# Patient Record
Sex: FEMALE | Race: AMERICAN INDIAN OR ALASKA NATIVE | Employment: UNEMPLOYED | URBAN - METROPOLITAN AREA
[De-identification: names, ages, dates, MRNs, and addresses within clinical notes are randomized per-mention and may not be internally consistent; named-entity substitution may affect disease eponyms.]

---

## 2019-10-07 ENCOUNTER — HOSPITAL ENCOUNTER (INPATIENT)
Age: 19
LOS: 3 days | Discharge: HOME | DRG: 754 | End: 2019-10-10
Attending: PSYCHIATRY & NEUROLOGY | Admitting: PSYCHIATRY & NEUROLOGY
Payer: COMMERCIAL

## 2019-10-07 ENCOUNTER — HOSPITAL ENCOUNTER (EMERGENCY)
Age: 19
Discharge: OTHER INSTITUTION | End: 2019-10-07
Attending: EMERGENCY MEDICINE
Payer: COMMERCIAL

## 2019-10-07 VITALS
OXYGEN SATURATION: 99 % | SYSTOLIC BLOOD PRESSURE: 130 MMHG | RESPIRATION RATE: 15 BRPM | DIASTOLIC BLOOD PRESSURE: 77 MMHG | HEART RATE: 77 BPM | TEMPERATURE: 98.9 F

## 2019-10-07 DIAGNOSIS — R45.88 NON-SUICIDAL SELF HARM: Primary | ICD-10-CM

## 2019-10-07 LAB
ALBUMIN SERPL-MCNC: 4.2 G/DL (ref 3.5–5)
ALBUMIN/GLOB SERPL: 1.1 {RATIO} (ref 1.1–2.2)
ALP SERPL-CCNC: 66 U/L (ref 45–117)
ALT SERPL-CCNC: 22 U/L (ref 12–78)
AMPHET UR QL SCN: NEGATIVE
ANION GAP SERPL CALC-SCNC: 8 MMOL/L (ref 5–15)
APAP SERPL-MCNC: <2 UG/ML (ref 10–30)
APPEARANCE UR: CLEAR
AST SERPL-CCNC: 7 U/L (ref 15–37)
BACTERIA URNS QL MICRO: NEGATIVE /HPF
BARBITURATES UR QL SCN: NEGATIVE
BASOPHILS # BLD: 0 K/UL (ref 0–0.1)
BASOPHILS NFR BLD: 0 % (ref 0–1)
BENZODIAZ UR QL: NEGATIVE
BILIRUB SERPL-MCNC: 0.2 MG/DL (ref 0.2–1)
BILIRUB UR QL: NEGATIVE
BUN SERPL-MCNC: 10 MG/DL (ref 6–20)
BUN/CREAT SERPL: 14 (ref 12–20)
CALCIUM SERPL-MCNC: 9.6 MG/DL (ref 8.5–10.1)
CANNABINOIDS UR QL SCN: POSITIVE
CHLORIDE SERPL-SCNC: 106 MMOL/L (ref 97–108)
CO2 SERPL-SCNC: 24 MMOL/L (ref 21–32)
COCAINE UR QL SCN: NEGATIVE
COLOR UR: ABNORMAL
COMMENT, HOLDF: NORMAL
CREAT SERPL-MCNC: 0.7 MG/DL (ref 0.55–1.02)
DIFFERENTIAL METHOD BLD: ABNORMAL
DRUG SCRN COMMENT,DRGCM: ABNORMAL
EOSINOPHIL # BLD: 0 K/UL (ref 0–0.4)
EOSINOPHIL NFR BLD: 0 % (ref 0–7)
EPITH CASTS URNS QL MICRO: ABNORMAL /LPF
ERYTHROCYTE [DISTWIDTH] IN BLOOD BY AUTOMATED COUNT: 14.3 % (ref 11.5–14.5)
ETHANOL SERPL-MCNC: <10 MG/DL
GLOBULIN SER CALC-MCNC: 3.8 G/DL (ref 2–4)
GLUCOSE SERPL-MCNC: 117 MG/DL (ref 65–100)
GLUCOSE UR STRIP.AUTO-MCNC: NEGATIVE MG/DL
HCG UR QL: NEGATIVE
HCT VFR BLD AUTO: 39.5 % (ref 35–47)
HGB BLD-MCNC: 12.5 G/DL (ref 11.5–16)
HGB UR QL STRIP: ABNORMAL
HYALINE CASTS URNS QL MICRO: ABNORMAL /LPF (ref 0–5)
IMM GRANULOCYTES # BLD AUTO: 0 K/UL (ref 0–0.04)
IMM GRANULOCYTES NFR BLD AUTO: 0 % (ref 0–0.5)
KETONES UR QL STRIP.AUTO: 15 MG/DL
LEUKOCYTE ESTERASE UR QL STRIP.AUTO: NEGATIVE
LYMPHOCYTES # BLD: 1.7 K/UL (ref 0.8–3.5)
LYMPHOCYTES NFR BLD: 17 % (ref 12–49)
MCH RBC QN AUTO: 28.2 PG (ref 26–34)
MCHC RBC AUTO-ENTMCNC: 31.6 G/DL (ref 30–36.5)
MCV RBC AUTO: 89.2 FL (ref 80–99)
METHADONE UR QL: NEGATIVE
MONOCYTES # BLD: 0.4 K/UL (ref 0–1)
MONOCYTES NFR BLD: 4 % (ref 5–13)
NEUTS SEG # BLD: 7.8 K/UL (ref 1.8–8)
NEUTS SEG NFR BLD: 79 % (ref 32–75)
NITRITE UR QL STRIP.AUTO: NEGATIVE
NRBC # BLD: 0 K/UL (ref 0–0.01)
NRBC BLD-RTO: 0 PER 100 WBC
OPIATES UR QL: NEGATIVE
PCP UR QL: NEGATIVE
PH UR STRIP: 6.5 [PH] (ref 5–8)
PLATELET # BLD AUTO: 302 K/UL (ref 150–400)
PMV BLD AUTO: 10.9 FL (ref 8.9–12.9)
POTASSIUM SERPL-SCNC: 3.6 MMOL/L (ref 3.5–5.1)
PROT SERPL-MCNC: 8 G/DL (ref 6.4–8.2)
PROT UR STRIP-MCNC: NEGATIVE MG/DL
RBC # BLD AUTO: 4.43 M/UL (ref 3.8–5.2)
RBC #/AREA URNS HPF: ABNORMAL /HPF (ref 0–5)
SALICYLATES SERPL-MCNC: <1.7 MG/DL (ref 2.8–20)
SAMPLES BEING HELD,HOLD: NORMAL
SODIUM SERPL-SCNC: 138 MMOL/L (ref 136–145)
SP GR UR REFRACTOMETRY: 1.02 (ref 1–1.03)
UR CULT HOLD, URHOLD: NORMAL
UROBILINOGEN UR QL STRIP.AUTO: 0.2 EU/DL (ref 0.2–1)
WBC # BLD AUTO: 10 K/UL (ref 3.6–11)
WBC URNS QL MICRO: ABNORMAL /HPF (ref 0–4)

## 2019-10-07 PROCEDURE — 81025 URINE PREGNANCY TEST: CPT

## 2019-10-07 PROCEDURE — 99284 EMERGENCY DEPT VISIT MOD MDM: CPT

## 2019-10-07 PROCEDURE — 80053 COMPREHEN METABOLIC PANEL: CPT

## 2019-10-07 PROCEDURE — 36415 COLL VENOUS BLD VENIPUNCTURE: CPT

## 2019-10-07 PROCEDURE — 81001 URINALYSIS AUTO W/SCOPE: CPT

## 2019-10-07 PROCEDURE — 90791 PSYCH DIAGNOSTIC EVALUATION: CPT

## 2019-10-07 PROCEDURE — 80307 DRUG TEST PRSMV CHEM ANLYZR: CPT

## 2019-10-07 PROCEDURE — 85025 COMPLETE CBC W/AUTO DIFF WBC: CPT

## 2019-10-07 PROCEDURE — 65220000003 HC RM SEMIPRIVATE PSYCH

## 2019-10-07 RX ORDER — IBUPROFEN 200 MG
400 TABLET ORAL
COMMUNITY
End: 2019-10-10

## 2019-10-07 NOTE — PROGRESS NOTES
Admission Medication Reconciliation:    Information obtained from:  patient  RxQuery data available¹:  YES    Comments/Recommendations: All medications/allergies have been reviewed and updated; last medication administration times reviewed and recorded. The patient denies taking any scheduled maintenance medications (confirmed via insurance information). Pt reports taking occasional ibuprofen for headache. Changes made to Prior to Admission (PTA) Medication List:   ?   Medications Added:   - ibuprofen  ? Medications Changed:   - None   ? Medications Removed:   - None     ¹RxQuery pharmacy benefit data reflects medications filled and processed through the patient's insurance, however   this data does NOT capture whether the medication was picked up or is currently being taken by the patient. Allergies:  Patient has no known allergies. Significant PMH/Disease States:   History reviewed. No pertinent past medical history. Chief Complaint for this Admission:    Chief Complaint   Patient presents with    Mental Health Problem       Prior to Admission Medications:   Prior to Admission Medications   Prescriptions Last Dose Informant Patient Reported? Taking?   ibuprofen (MOTRIN) 200 mg tablet   Yes Yes   Sig: Take 400 mg by mouth every eight (8) hours as needed for Pain. Facility-Administered Medications: None     Thank you for allowing pharmacy to participate in the coordination of this patient's care. If you have any other questions, please contact the medication reconciliation pharmacist at [phone]. Ludin Monroe D., BCPS

## 2019-10-07 NOTE — ED NOTES
1958 Bedside and Verbal shift change report given to Brittaney Salazar RN (oncoming nurse) by Azra Smith RN (offgoing nurse). Report included the following information SBAR and ED Summary. 2009 attempted to call report to [facility]; RN states she can't take report because there is no bed yet, will try again and [facility] RN will call back with an update    2055 TRANSFER - OUT REPORT:    Verbal report given to AUBREE Mayes(name) on Fabienne Boyd  being transferred to [facility] (unit) for urgent transfer       Report consisted of patients Situation, Background, Assessment and   Recommendations(SBAR). Information from the following report(s) SBAR, ED Summary and Recent Results was reviewed with the receiving nurse. Lines:       Opportunity for questions and clarification was provided.       2100 called [facility] to set up patient transport; ETA within the hour

## 2019-10-07 NOTE — ED NOTES
Bedside and Verbal shift change report given to [address] (oncoming nurse) by Adriana Betancourt (offgoing nurse). Report included the following information SBAR, Kardex, ED Summary, [facility] and Recent Results.

## 2019-10-07 NOTE — BSMART NOTE
Comprehensive Assessment Form Part 1 Section I - Disposition Axis I - Major depressive disorder Axis II - Deferred Axis III - History reviewed. No pertinent past medical history. Axis IV - lack of structure, left her L[employer] day, moved across country, lack of medication Axis V - 45 The Medical Doctor to Psychiatrist conference was not completed. The Medical Doctor is in agreement with Psychiatrist disposition because of (reason) patient is voluntary and seeking admission. The plan is admission to [facility] once cleared. The on-call Psychiatrist consulted was Dr. Jesus Rich. The admitting Psychiatrist will be Dr. Jesus Rich. The admitting Diagnosis is MDD. The Payor source is [facility]. Section II - Integrated Summary Summary:  Patient is a 24yo female who presents to the ER due to suicidal ideation with increased frequency and intensity. She reports moving from [place] recently and set up an appointment with a therapist, Elisabeth Laboy at [facility] [employer]. She was seeing her and it was decided she should see someone for medication. She met with the psychiatrist at [facility] today and they referred her to the ER due to suicidal ideation. Patient reports she was previously on Prozac with positive results but that she stopped seeing her psychiatrist in [place] and stopped the medication. She reports self-harming herself started around age 13 and that she has harmed herself by digging her nails into her legs. With all of the ideation and self-harm it was determined she needed to stay safe and get on medication as soon as possible. She denies homicidal ideation, denies suicidal plan or intent, and denies hallucinations. She was present with her significant other who is supportive of her and encouraging her to seek help.  She reports added stressors are moving from [place] to [place] when her family is in [place], [address] for a job and contemplating finishing college, and leaving her [employer] ([employer]) and feeling lost. She reports 2 prior admission in 2016 for panic attack and other mental health symptoms (vague with details) and in 2017 when she decided to punish herself by taking a bunch of vitamins and supplements to make herself sick then worrying she may have done harm after she was sick for a bit. The patienthas demonstrated mental capacity to provide informed consent. The information is given by the patient and spouse/SO. The Chief Complaint is suicidal ideation. The Precipitant Factors are see axis 4. Previous Hospitalizations: yes The patient has not previously been in restraints. Current Psychiatrist and/or  is [facility]. Lethality Assessment: 
 
The potential for suicide noted by the following: previous history of attempts which occured on (date)2017 in the form(s) of overdosing, vague plan and ideation . The potential for homicide is not noted. The patient has not been a perpetrator of sexual or physical abuse. There are not pending charges. The patient is felt to be at risk for self harm or harm to others. The attending nurse was advised that security has been notified. Section III - Psychosocial 
The patient's overall mood and attitude is calm and cooperative. Feelings of helplessness and hopelessness are observed by verbal report. Generalized anxiety is not observed. Panic is not observed. Phobias are not observed. Obsessive compulsive tendencies are not observed. Section IV - Mental Status Exam 
The patient's appearance shows no evidence of impairment. The patient's behavior shows no evidence of impairment. The patient is oriented to time, place, person and situation. The patient's speech is soft. The patient's mood is depressed and is sad. The range of affect shows no evidence of impairment.   The patient's thought content demonstrates no evidence of impairment. The thought process shows no evidence of impairment. The patient's perception shows no evidence of impairment. The patient's memory shows no evidence of impairment. The patient's appetite shows no evidence of impairment. The patient's sleep shows no evidence of impairment. The patient's insight shows no evidence of impairment. The patient's judgement shows no evidence of impairment. Section V - Substance Abuse The patient is using substances. The patient is using tobacco by inhalation, alcohol for unknown with last use on unknown and cannabis by inhalation for 1-5 years with last use on yesterday. The patient has experienced the following withdrawal symptoms: N/A. Section VI - Living Arrangements The patient has a significant other. This person's approximate age is 21 and appears to be in good health. The patient lives with a significant other. The patient has no children. The patient does plan to return home upon discharge. The patient does not have legal issues pending. The patient's source of income comes from family. Church and cultural practices have not been voiced at this time. The patient's greatest support comes from  and this person will be involved with the treatment. The patient has not been in an event described as horrible or outside the realm of ordinary life experience either currently or in the past. 
The patient has not been a victim of sexual/physical abuse. Section VII - Other Areas of Clinical Concern The highest grade achieved is some college with the overall quality of school experience being described as good. The patient is currently unemployed and speaks [place] as a primary language. The patient has no communication impairments affecting communication. The patient's preference for learning can be described as: can read and write adequately.   The patient's hearing is normal.  The patient's vision is normal. 
 
 
 [facility]

## 2019-10-07 NOTE — ED TRIAGE NOTES
Patient presents from home with complaints of SI \"for the last couple of weeks\". Patient reports she has no concrete plan and denies HI. Patient agrees to contract for safety at this time.   Patient has friend at bedside

## 2019-10-07 NOTE — ED PROVIDER NOTES
The history is provided by the patient and a friend. No  was used. Mental Health Problem    This is a chronic problem. The current episode started more than 1 week ago. The problem has not changed since onset. Associated symptoms include self-injury. Pertinent negatives include no confusion, no somnolence, no seizures, no unresponsiveness, no weakness, no agitation, no delusions, no hallucinations, no violence, no tingling and no numbness. Mental status baseline is normal.  Risk factors include the patient not taking meds correctly. Her past medical history is significant for depression. Her past medical history does not include diabetes, seizures, liver disease, CVA, TIA, AIDS, hypertension, dementia, psychotropic medication treatment, head trauma or heart disease. History reviewed. No pertinent past medical history. History reviewed. No pertinent surgical history. History reviewed. No pertinent family history.     Social History     Socioeconomic History    Marital status: SINGLE     Spouse name: Not on file    Number of children: Not on file    Years of education: Not on file    Highest education level: Not on file   Occupational History    Not on file   Social Needs    Financial resource strain: Not on file    Food insecurity:     Worry: Not on file     Inability: Not on file    Transportation needs:     Medical: Not on file     Non-medical: Not on file   Tobacco Use    Smoking status: Not on file   Substance and Sexual Activity    Alcohol use: Not on file    Drug use: Not on file    Sexual activity: Not on file   Lifestyle    Physical activity:     Days per week: Not on file     Minutes per session: Not on file    Stress: Not on file   Relationships    Social connections:     Talks on phone: Not on file     Gets together: Not on file     Attends Mandaen service: Not on file     Active member of club or organization: Not on file     Attends meetings of clubs or organizations: Not on file     Relationship status: Not on file    Intimate partner violence:     Fear of current or ex partner: Not on file     Emotionally abused: Not on file     Physically abused: Not on file     Forced sexual activity: Not on file   Other Topics Concern    Not on file   Social History Narrative    Not on file         ALLERGIES: Patient has no known allergies. Review of Systems   Constitutional: Negative for activity change, chills and fever. HENT: Negative for nosebleeds, sore throat, trouble swallowing and voice change. Eyes: Negative for visual disturbance. Respiratory: Negative for shortness of breath. Cardiovascular: Negative for chest pain and palpitations. Gastrointestinal: Negative for abdominal pain, constipation, diarrhea and nausea. Genitourinary: Negative for difficulty urinating, dysuria, hematuria and urgency. Musculoskeletal: Negative for back pain, neck pain and neck stiffness. Skin: Negative for color change. Allergic/Immunologic: Negative for immunocompromised state. Neurological: Negative for dizziness, tingling, seizures, syncope, weakness, light-headedness, numbness and headaches. Psychiatric/Behavioral: Positive for self-injury and suicidal ideas. Negative for agitation, behavioral problems, confusion and hallucinations. Vitals:    10/07/19 1537 10/07/19 1619   BP:  153/73   Pulse: (!) 106 (!) 56   Resp:  16   Temp:  98.6 °F (37 °C)   SpO2: 98% 99%            Physical Exam   Constitutional: She appears well-developed and well-nourished. No distress. HENT:   Head: Atraumatic. Eyes: EOM are normal.   Neck: No tracheal deviation present. Cardiovascular:   Warm and well perfused   Pulmonary/Chest: Effort normal. No respiratory distress. Musculoskeletal: Normal range of motion. Neurological: She is alert. Coordination normal.   Skin: Skin is warm and dry. She is not diaphoretic. Psychiatric: She has a normal mood and affect.  Her behavior is normal. Judgment and thought content normal. She expresses no suicidal plans and no homicidal plans. Nursing note and vitals reviewed. MDM     This is a 13-year-old female with past medical history, review of systems, physical exam as above, presenting with self-harm, in the setting of a history of PTSD, depression, anxiety. Patient states several year history of the same, previous hospitalization in the [place] area for the same. She endorses previous suicide by overdose. She states she has been noncompliant with medications over the last 2 years, seeking to reestablish care today, and referred to the emergency department. She denies tobacco use, use of alcohol, or illicit drugs. She denies auditory or visual hallucinations. She denies active suicidality, or homicidality at this time. Physical exam remarkable for well-appearing young female, in no acute distress. Plan to consult with psychiatry for evaluation, disposition pending. Procedures    6:04 PM  [facility] states patient is agreeable with voluntary admission, will pull lab work.

## 2019-10-08 PROBLEM — F41.1 GENERALIZED ANXIETY DISORDER: Status: ACTIVE | Noted: 2019-10-08

## 2019-10-08 PROBLEM — F32.A DEPRESSIVE DISORDER: Status: ACTIVE | Noted: 2019-10-08

## 2019-10-08 PROCEDURE — 74011250637 HC RX REV CODE- 250/637: Performed by: PSYCHIATRY & NEUROLOGY

## 2019-10-08 PROCEDURE — 74011250637 HC RX REV CODE- 250/637: Performed by: NURSE PRACTITIONER

## 2019-10-08 PROCEDURE — 65220000003 HC RM SEMIPRIVATE PSYCH

## 2019-10-08 RX ORDER — CITALOPRAM 20 MG/1
10 TABLET, FILM COATED ORAL DAILY
Status: DISCONTINUED | OUTPATIENT
Start: 2019-10-08 | End: 2019-10-10 | Stop reason: HOSPADM

## 2019-10-08 RX ORDER — HALOPERIDOL 5 MG/ML
5 INJECTION INTRAMUSCULAR
Status: DISCONTINUED | OUTPATIENT
Start: 2019-10-07 | End: 2019-10-10 | Stop reason: HOSPADM

## 2019-10-08 RX ORDER — ACETAMINOPHEN 325 MG/1
650 TABLET ORAL
Status: DISCONTINUED | OUTPATIENT
Start: 2019-10-07 | End: 2019-10-10 | Stop reason: HOSPADM

## 2019-10-08 RX ORDER — ADHESIVE BANDAGE
30 BANDAGE TOPICAL DAILY PRN
Status: DISCONTINUED | OUTPATIENT
Start: 2019-10-07 | End: 2019-10-10 | Stop reason: HOSPADM

## 2019-10-08 RX ORDER — DIPHENHYDRAMINE HYDROCHLORIDE 50 MG/ML
50 INJECTION, SOLUTION INTRAMUSCULAR; INTRAVENOUS
Status: DISCONTINUED | OUTPATIENT
Start: 2019-10-07 | End: 2019-10-10 | Stop reason: HOSPADM

## 2019-10-08 RX ORDER — BENZTROPINE MESYLATE 1 MG/1
1 TABLET ORAL
Status: DISCONTINUED | OUTPATIENT
Start: 2019-10-07 | End: 2019-10-10 | Stop reason: HOSPADM

## 2019-10-08 RX ORDER — TRAZODONE HYDROCHLORIDE 50 MG/1
50 TABLET ORAL
Status: DISCONTINUED | OUTPATIENT
Start: 2019-10-07 | End: 2019-10-10 | Stop reason: HOSPADM

## 2019-10-08 RX ORDER — OLANZAPINE 5 MG/1
5 TABLET ORAL
Status: DISCONTINUED | OUTPATIENT
Start: 2019-10-07 | End: 2019-10-10 | Stop reason: HOSPADM

## 2019-10-08 RX ORDER — LORAZEPAM 2 MG/ML
1 INJECTION INTRAMUSCULAR
Status: DISCONTINUED | OUTPATIENT
Start: 2019-10-07 | End: 2019-10-10 | Stop reason: HOSPADM

## 2019-10-08 RX ORDER — HYDROXYZINE 25 MG/1
50 TABLET, FILM COATED ORAL
Status: DISCONTINUED | OUTPATIENT
Start: 2019-10-07 | End: 2019-10-10 | Stop reason: HOSPADM

## 2019-10-08 RX ADMIN — ACETAMINOPHEN 650 MG: 325 TABLET ORAL at 12:06

## 2019-10-08 RX ADMIN — ACETAMINOPHEN 650 MG: 325 TABLET ORAL at 23:53

## 2019-10-08 RX ADMIN — CITALOPRAM HYDROBROMIDE 10 MG: 20 TABLET ORAL at 17:55

## 2019-10-08 RX ADMIN — TRAZODONE HYDROCHLORIDE 50 MG: 50 TABLET ORAL at 01:14

## 2019-10-08 RX ADMIN — ACETAMINOPHEN 650 MG: 325 TABLET ORAL at 01:14

## 2019-10-08 RX ADMIN — ACETAMINOPHEN 650 MG: 325 TABLET ORAL at 17:42

## 2019-10-08 NOTE — BH NOTES
PSYCHOSOCIAL ASSESSMENT  :Patient identifying info:  Ron Mendoza is a 19 y.o., female admitted 10/7/2019 10:14 PM     Presenting problem and precipitating factors: Patient was transferred to [address] [place] [address] from [facility] ED for increased depression, SI, and anxiety. Pt reported she recently moved to [place] from [place] and has been experiencing depression. She reports she started seeing a therapist and had an appointment with a PMHNP, who directed her to come to the ED for an evaluation due to SI. Pt identifies multiple psychosocial stressors including unemployment, recent move, and recently leaving [employer]. Mental status assessment: Alert, oriented, nervous, cooperative    Strengths: Supportive family and boyfriend; seeks help    Collateral information: Tammi Hebert (boyfriend [phone])    Current psychiatric /substance abuse providers and contact info: [facility] (therapist and PMHNP)    Previous psychiatric/substance abuse providers and response to treatment: 1 previous inpatient psychiatric hospitalization (2016 in [facility])    Family history of mental illness or substance abuse: Pt states that the majority of her family suffers from depressive disorders and anxiety    Substance abuse history:  [facility]  Social History     Tobacco Use    Smoking status: Not on file   Substance Use Topics    Alcohol use: Not on file       History of biomedical complications associated with substance abuse: Denies    Patient's current acceptance of treatment or motivation for change: Good    Family constellation: Mother, Father, Sisters    Is significant other involved? Yes, boyfriend      Describe support system: Boyfriend    Describe living arrangements and home environment: Patient lives with her boyfriend. She recently moved to [address] [place] [address] from [place].     Health issues:   Hospital Problems  Never Reviewed          Codes Class Noted POA    Depressive disorder ICD-10-CM: F32.9  ICD-9-CM: 381  10/8/2019 Unknown        * (Principal) Generalized anxiety disorder ICD-10-CM: F41.1  ICD-9-CM: 300.02  10/8/2019 Unknown              Trauma history: Pt endorses a history of trauma related to [address] [place] [address] issues: None indicated    History of  service: No    Financial status: Income from boyfriend    Jewish/cultural factors: ex-Restorationist    Education/work history: Graduated high school; currently unemployed; previous work at [employer]    Have you been licensed as a health care professional (current or ): No    Leisure and recreation preferences: Spending time with boyfriend    Describe coping skills: Limited    Welford Herring  10/8/2019

## 2019-10-08 NOTE — PROGRESS NOTES
Problem: Depressed Mood (Adult/Pediatric)  Goal: *STG: Participates in treatment plan  Outcome: Progressing Towards Goal  Goal: *STG: Attends activities and groups  Outcome: Progressing Towards Goal  Goal: *STG: Remains safe in hospital  Outcome: Progressing Towards Goal  Goal: *STG: Complies with medication therapy  Outcome: Progressing Towards Goal

## 2019-10-08 NOTE — PROGRESS NOTES
[facility] Medication Reconciliation     Medication reconciliation was completed by pharmacist at [facility] prior to patient transfer. Please see note by Marichuy Jean BaptisteD dated 10/7/19 @ 18:51.      Thank you,  Heather March, PHARMD, BCPS

## 2019-10-08 NOTE — GROUP NOTE
SHANE  GROUP DOCUMENTATION INDIVIDUAL                                                                          Group Therapy Note    Date: October 8    Group Start Time: 1540  Group End Time: 1630  Group Topic: Nursing    [address] GROUP DOCUMENTATION GROUP    Group Therapy Note    Attendees: 8         Attendance: Attended    Patient's Goal:  Relaxation technique    Interventions/techniques: Informed    Follows Directions:  Followed directions    Interactions: Interacted appropriately    Mental Status: Calm    Behavior/appearance: Attentive and Cooperative    Goals Achieved: Able to engage in interactions and Able to listen to others      Additional Notes:      Cole Wilburn

## 2019-10-08 NOTE — BH NOTES
Patient arrived in wheelchair with  and is a voluntary admission for SI WITH NO PLAN. Patient will be under DR Oscar Kraus service. Patient recently left her [employer]. Also Patient was on [address] in [place]. and is a daily user here. Has a HX of digging her nails into her skin. Patient is calm and cooperative during admission. Patient has a history of anxiety and depression. Eusebia Yeboah

## 2019-10-08 NOTE — BH NOTES
Report received from Cheyenne Mcclelland RN at 0700. Patient does not have any morning medication. Patient pleasant, calm and cooperative. Patient seen in dayroom eating breakfast.   
 
1030  Patient in dayroom attending group. 1206  Patient given tylenol for menstrual cramps. Pain 5/10.

## 2019-10-08 NOTE — GROUP NOTE
SHANE  GROUP DOCUMENTATION INDIVIDUAL Group Therapy Note Date: October 8 Group Start Time: 1100 Group End Time: 1200 Group Topic: Topic Group [facility] 3 ACUTE BEHAV [facility], [address] [facility] GROUP DOCUMENTATION GROUP Group Therapy Note Attendees: 7 Attendance: Did not attend Patient's Goal: Interventions/techniques:  
Lubna Sy

## 2019-10-08 NOTE — H&P
Hospitalist Admission Note    NAME: Sohail Cortez   :  2000   MRN:  [id number]   Room Number: 899/95  @ [facility]     Date/Time:  10/8/2019 2:15 PM    Patient PCP: Jenny Avery MD  ______________________________________________________________________  Given the patient's current clinical presentation, I have a high level of concern for decompensation if discharged from the emergency department. Complex decision making was performed, which includes reviewing the patient's available past medical records, laboratory results, and x-ray films. My assessment of this patient's clinical condition and my plan of care is as follows. Assessment / Plan:  Shena Cedar dependence  Tobacco dependence   Medical clearance for psych    PLAN:  Nicotine patch offered  Patient was counseled extensively on the need to abstain from tobacco, its addictive tendencies, its deleterious effects on the lungs as well as its financial sequelae    Patient was counseled extensively on the need to abstain from using illicit drugs, its addictive tendencies, its deleterious effects on the brain and other organs as well as its financial and social sequelae. 1.  Psychiatric treatment and management of health issues,Defer to psychiatrist for further management. 2.  Continue home meds. 3.  Medically stable at this time. We will follow up on a p.r.n. basis. 4.  No VTE prophylaxis indicated or warranted at this time. Subjective:   CHIEF COMPLAINT: depressed    HISTORY OF PRESENT ILLNESS:     Sohail Cortez is a 19 y.o.} female with PMH of anxiety/depression who presents to ED with c/o above. PER ED-Patient has mental health problem. This is a chronic problem. The current episode started more than 1 week ago. The problem has not changed since onset. Associated symptoms include self-injury.  Pertinent negatives include no confusion, no somnolence, no seizures, no unresponsiveness, no weakness, no agitation, no delusions, no hallucinations, no violence, no tingling and no numbness. Mental status baseline is normal.  Risk factors include the patient not taking meds correctly. Her past medical history is significant for depression. Her past medical history does not include diabetes, seizures, liver disease, CVA, TIA, AIDS, hypertension, dementia, psychotropic medication treatment, head trauma or heart disease. Patient denies any past medical history except as listed above. Denies fever,chills,chest pain, sob,abd pan,diarrhea,constipation,lighhadedness. No Hx DM,HTN. Does report being on medical marijuana daily in [place] and has been using it daily. requesting for flu shot at this time. No current medical concerns at this time. past medical history -depression,MDD    No past surgical history -reviewed . Social History     Tobacco Use    Smoking status: Yes 1/2 PPD   Substance Use Topics    Alcohol use: oocasional         No family history on file. No Known Allergies     Prior to Admission medications    Medication Sig Start Date End Date Taking? Authorizing Provider   ibuprofen (MOTRIN) 200 mg tablet Take 400 mg by mouth every eight (8) hours as needed for Pain. Provider, Historical       REVIEW OF SYSTEMS:     I am not able to complete the review of systems because:    The patient is intubated and sedated    The patient has altered mental status due to his acute medical problems    The patient has baseline aphasia from prior stroke(s)    The patient has baseline dementia and is not reliable historian    The patient is in acute medical distress and unable to provide information           Total of 12 systems reviewed as follows:       POSITIVE= underlined text  Negative = text not underlined  General:  fever, chills, sweats, generalized weakness, weight loss/gain,      loss of appetite   Eyes:    blurred vision, eye pain, loss of vision, double vision  ENT:    rhinorrhea, pharyngitis Respiratory:   cough, sputum production, SOB, FREED, wheezing, pleuritic pain   Cardiology:   chest pain, palpitations, orthopnea, PND, edema, syncope   Gastrointestinal:  abdominal pain , N/V, diarrhea, dysphagia, constipation, bleeding   Genitourinary:  frequency, urgency, dysuria, hematuria, incontinence   Muskuloskeletal :  arthralgia, myalgia, back pain  Hematology:  easy bruising, nose or gum bleeding, lymphadenopathy   Dermatological: rash, ulceration, pruritis, color change / jaundice  Endocrine:   hot flashes or polydipsia   Neurological:  headache, dizziness, confusion, focal weakness, paresthesia,     Speech difficulties, memory loss, gait difficulty  Psychological: Feelings of anxiety, depression, agitation    Objective:   VITALS:    Visit Vitals  /72 (BP 1 Location: Left arm, BP Patient Position: Sitting)   Pulse 86   Temp 98.1 °F (36.7 °C)   Resp 18   Ht 5' 1\" (1.549 m)   Wt 61.7 kg (136 lb)   SpO2 99%   Breastfeeding? No   BMI 25.70 kg/m²       PHYSICAL EXAM:    General:    Alert, cooperative, no distress, appears stated age. HEENT: Atraumatic, anicteric sclerae, pink conjunctivae     No oral ulcers, mucosa moist, throat clear, dentition fair  Neck:  Supple, symmetrical,  thyroid: non tender  Lungs:   Clear to auscultation bilaterally. No Wheezing or Rhonchi. No rales. Chest wall:  No tenderness  No Accessory muscle use. Heart:   Regular  rhythm,  No  murmur   No edema  Abdomen:   Soft, non-tender. Not distended. Bowel sounds normal  Extremities: No cyanosis. No clubbing,      Skin turgor normal, Capillary refill normal, Radial dial pulse 2+  Skin:     Not pale. Not Jaundiced  No rashes   Psych:  Good insight. Not depressed. Not anxious or agitated. Neurologic: EOMs intact. No facial asymmetry. No aphasia or slurred speech. Symmetrical strength, Sensation grossly intact.  Alert and oriented X 4.     ______________________________________________________________________    Care Plan discussed with:  Patient/Family    ________________________________________________________________________  TOTAL TIME:  25 Minutes    Critical Care Provided     Minutes non procedure based      Comments     Reviewed previous records   >50% of visit spent in counseling and coordination of care  Discussion with patient and/or family and questions answered       ________________________________________________________________________  Signed: Pal Anglin MD    Procedures: see electronic medical records for all procedures/Xrays and details which were not copied into this note but were reviewed prior to creation of Plan. LAB DATA REVIEWED:    Recent Results (from the past 24 hour(s))   CBC WITH AUTOMATED DIFF    Collection Time: 10/07/19  6:09 PM   Result Value Ref Range    WBC 10.0 3.6 - 11.0 K/uL    RBC 4.43 3.80 - 5.20 M/uL    HGB 12.5 11.5 - 16.0 g/dL    HCT 39.5 35.0 - 47.0 %    MCV 89.2 80.0 - 99.0 FL    MCH 28.2 26.0 - 34.0 PG    MCHC 31.6 30.0 - 36.5 g/dL    RDW 14.3 11.5 - 14.5 %    PLATELET 587 815 - 571 K/uL    MPV 10.9 8.9 - 12.9 FL    NRBC 0.0 0  WBC    ABSOLUTE NRBC 0.00 0.00 - 0.01 K/uL    NEUTROPHILS 79 (H) 32 - 75 %    LYMPHOCYTES 17 12 - 49 %    MONOCYTES 4 (L) 5 - 13 %    EOSINOPHILS 0 0 - 7 %    BASOPHILS 0 0 - 1 %    IMMATURE GRANULOCYTES 0 0.0 - 0.5 %    ABS. NEUTROPHILS 7.8 1.8 - 8.0 K/UL    ABS. LYMPHOCYTES 1.7 0.8 - 3.5 K/UL    ABS. MONOCYTES 0.4 0.0 - 1.0 K/UL    ABS. EOSINOPHILS 0.0 0.0 - 0.4 K/UL    ABS. BASOPHILS 0.0 0.0 - 0.1 K/UL    ABS. IMM.  GRANS. 0.0 0.00 - 0.04 K/UL    DF AUTOMATED     METABOLIC PANEL, COMPREHENSIVE    Collection Time: 10/07/19  6:09 PM   Result Value Ref Range    Sodium 138 136 - 145 mmol/L    Potassium 3.6 3.5 - 5.1 mmol/L    Chloride 106 97 - 108 mmol/L    CO2 24 21 - 32 mmol/L    Anion gap 8 5 - 15 mmol/L    Glucose 117 (H) 65 - 100 mg/dL    BUN 10 6 - 20 MG/DL    Creatinine 0.70 0.55 - 1.02 MG/DL    BUN/Creatinine ratio 14 12 - 20      GFR est AA >60 >60 ml/min/1.73m2    GFR est non-AA >60 >60 ml/min/1.73m2    Calcium 9.6 8.5 - 10.1 MG/DL    Bilirubin, total 0.2 0.2 - 1.0 MG/DL    ALT (SGPT) 22 12 - 78 U/L    AST (SGOT) 7 (L) 15 - 37 U/L    Alk. phosphatase 66 45 - 117 U/L    Protein, total 8.0 6.4 - 8.2 g/dL    Albumin 4.2 3.5 - 5.0 g/dL    Globulin 3.8 2.0 - 4.0 g/dL    A-G Ratio 1.1 1.1 - 2.2     SAMPLES BEING HELD    Collection Time: 10/07/19  6:09 PM   Result Value Ref Range    SAMPLES BEING HELD 1blu     COMMENT        Add-on orders for these samples will be processed based on acceptable specimen integrity and analyte stability, which may vary by analyte. ACETAMINOPHEN    Collection Time: 10/07/19  6:09 PM   Result Value Ref Range    Acetaminophen level <2 (L) 10 - 30 ug/mL   SALICYLATE    Collection Time: 10/07/19  6:09 PM   Result Value Ref Range    Salicylate level <1.6 (L) 2.8 - 20.0 MG/DL   ETHYL ALCOHOL    Collection Time: 10/07/19  6:09 PM   Result Value Ref Range    ALCOHOL(ETHYL),SERUM <10 <10 MG/DL   URINALYSIS W/MICROSCOPIC    Collection Time: 10/07/19  6:09 PM   Result Value Ref Range    Color YELLOW/STRAW      Appearance CLEAR CLEAR      Specific gravity 1.019 1.003 - 1.030      pH (UA) 6.5 5.0 - 8.0      Protein NEGATIVE  NEG mg/dL    Glucose NEGATIVE  NEG mg/dL    Ketone 15 (A) NEG mg/dL    Bilirubin NEGATIVE  NEG      Blood SMALL (A) NEG      Urobilinogen 0.2 0.2 - 1.0 EU/dL    Nitrites NEGATIVE  NEG      Leukocyte Esterase NEGATIVE  NEG      WBC 0-4 0 - 4 /hpf    RBC 0-5 0 - 5 /hpf    Epithelial cells FEW FEW /lpf    Bacteria NEGATIVE  NEG /hpf    Hyaline cast 0-2 0 - 5 /lpf   URINE CULTURE HOLD SAMPLE    Collection Time: 10/07/19  6:09 PM   Result Value Ref Range    Urine culture hold        URINE ON HOLD IN MICROBIOLOGY DEPT FOR 3 DAYS. IF UNPRESERVED URINE IS SUBMITTED, IT CANNOT BE USED FOR ADDITIONAL TESTING AFTER 24 HRS, RECOLLECTION WILL BE REQUIRED.    DRUG SCREEN, URINE    Collection Time: 10/07/19  6:09 PM   Result Value Ref Range    AMPHETAMINES NEGATIVE  NEG      BARBITURATES NEGATIVE  NEG      BENZODIAZEPINES NEGATIVE  NEG      COCAINE NEGATIVE  NEG      METHADONE NEGATIVE  NEG      OPIATES NEGATIVE  NEG      PCP(PHENCYCLIDINE) NEGATIVE  NEG      THC (TH-CANNABINOL) POSITIVE (A) NEG      Drug screen comment (NOTE)    HCG URINE, QL. - POC    Collection Time: 10/07/19  6:09 PM   Result Value Ref Range    Pregnancy test,urine (POC) NEGATIVE  NEG

## 2019-10-08 NOTE — BH NOTES
GROUP THERAPY PROGRESS NOTE    Britney Tsai is participating in Substance abuse group. Group time: 1 hour    Personal goal for participation: To understand addiction, criteria for diagnosis, and identify triggers and coping skills. Goal orientation: personal    Group therapy participation: active    Therapeutic interventions reviewed and discussed: Group discussion of substance use, abuse, and dependence and the DSM 5 criteria for a substance use disorder. Patients were able to self-rate themselves based on the 11 criteria for a substance use disorder and explore their own level of addiction for cigarettes, alcohol, heroin, and other substances. Group discussed how they feel when they are unable to use and ways substance use has hindered their lives. Triggers for use and coping skills to avoid use or manage symptoms until craving subsides were discussed. Impression of participation: Britney Tsai was present and engaged in discussion. She reports that she enjoys painting as a coping skill and that she does not have any substance abuse issues. She was inquiring about coping skills and ways that she can reduce anxiety and depression. She was supportive and attentive to others as they shared in group. Although she reports not having a substance use disorder, she was attentive and interested in the topic and shared her thoughts on substance use terms.     Patricia Tran [facility]

## 2019-10-09 PROCEDURE — 74011250637 HC RX REV CODE- 250/637: Performed by: PSYCHIATRY & NEUROLOGY

## 2019-10-09 PROCEDURE — 65220000003 HC RM SEMIPRIVATE PSYCH

## 2019-10-09 PROCEDURE — 74011250637 HC RX REV CODE- 250/637: Performed by: NURSE PRACTITIONER

## 2019-10-09 RX ADMIN — ACETAMINOPHEN 650 MG: 325 TABLET ORAL at 13:32

## 2019-10-09 RX ADMIN — HYDROXYZINE HYDROCHLORIDE 50 MG: 25 TABLET, FILM COATED ORAL at 05:22

## 2019-10-09 RX ADMIN — CITALOPRAM HYDROBROMIDE 10 MG: 20 TABLET ORAL at 09:06

## 2019-10-09 RX ADMIN — ACETAMINOPHEN 650 MG: 325 TABLET ORAL at 21:56

## 2019-10-09 RX ADMIN — TRAZODONE HYDROCHLORIDE 50 MG: 50 TABLET ORAL at 21:56

## 2019-10-09 NOTE — BH NOTES
Psychiatric Progress Note    Patient: Israel Freed MRN: [id number]  SSN: [id number]    YOB: 2000  Age: 19 y.o. Sex: female      Admit Date: 10/7/2019    LOS: 2 days     Subjective:     Israel Freed  reports doing better today and moods are fair. She had trouble sleeping last evening to receiving the celexa dose late last evening. However she obtained this am dose and is doing well. Denies SI/HI/AH/VH. No aggression or violence. Appropriately interactive and aware. Tolerating medications well. Eating well and sleeping well.     Objective:     Vitals:    10/08/19 0108 10/08/19 0830 10/08/19 2151 10/09/19 1000   BP: 132/87 131/72 131/77 132/76   Pulse: 80 86 74 79   Resp: 16 18 16 18   Temp: 98.3 °F (36.8 °C) 98.1 °F (36.7 °C) 97.2 °F (36.2 °C) 98.2 °F (36.8 °C)   SpO2: 100% 99% 100% 100%   Weight: 61.7 kg (136 lb)      Height: 5' 1\" (1.549 m)           Mental Status Exam:   Sensorium  oriented to time, place and person   Orientation situation   Relations cooperative   Eye Contact appropriate   Appearance:  age appropriate   Motor Behavior:  within normal limits   Speech:  normal volume and non-pressured   Vocabulary average   Thought Process: goal directed   Thought Content free of delusions and free of hallucinations   Suicidal ideations none   Homicidal ideations none   Mood:  depressed   Affect:  constricted   Memory recent  adequate   Memory remote:  adequate   Concentration:  adequate   Abstraction:  abstract   Insight:  good   Reliability fair   Judgment:  fair       MEDICATIONS:  Current Facility-Administered Medications   Medication Dose Route Frequency    OLANZapine (ZyPREXA) tablet 5 mg  5 mg Oral Q6H PRN    haloperidol lactate (HALDOL) injection 5 mg  5 mg IntraMUSCular Q6H PRN    benztropine (COGENTIN) tablet 1 mg  1 mg Oral BID PRN    diphenhydrAMINE (BENADRYL) injection 50 mg  50 mg IntraMUSCular BID PRN    hydrOXYzine HCl (ATARAX) tablet 50 mg  50 mg Oral TID PRN    LORazepam (ATIVAN) injection 1 mg  1 mg IntraMUSCular Q4H PRN    traZODone (DESYREL) tablet 50 mg  50 mg Oral QHS PRN    acetaminophen (TYLENOL) tablet 650 mg  650 mg Oral Q4H PRN    magnesium hydroxide (MILK OF MAGNESIA) 400 mg/5 mL oral suspension 30 mL  30 mL Oral DAILY PRN    influenza vaccine 2019-20 (6 mos+)(PF) (FLUARIX/FLULAVAL/FLUZONE QUAD) injection 0.5 mL  0.5 mL IntraMUSCular PRIOR TO DISCHARGE    citalopram (CELEXA) tablet 10 mg  10 mg Oral DAILY      DISCUSSION:   the risks and benefits of the proposed medication  patient given opportunity to ask questions    Lab/Data Review: All lab results for the last 24 hours reviewed.      No major concerns    Assessment:     Principal Problem:    Generalized anxiety disorder (10/8/2019)    Active Problems:    Depressive disorder (10/8/2019)        Plan:     Continue current care  Monitor celexa  Disposition planning with social work for the next few days    Signed By: Natty Saravia MD     October 9, 2019

## 2019-10-09 NOTE — PROGRESS NOTES
Problem: Falls - Risk of  Goal: *Absence of Falls  Description  Document Pamela Hobson Fall Risk and appropriate interventions in the flowsheet.   Outcome: Progressing Towards Goal  Note:   Fall Risk Interventions:       Mentation Interventions: Reorient patient    Medication Interventions: Teach patient to arise slowly                   Problem: Patient Education: Go to Patient Education Activity  Goal: Patient/Family Education  Outcome: Progressing Towards Goal     Problem: Depressed Mood (Adult/Pediatric)  Goal: *STG: Participates in treatment plan  Outcome: Progressing Towards Goal  Goal: *STG: Participates in 1:1 therapy sessions  Outcome: Progressing Towards Goal  Goal: *STG: Demonstrates reduction in symptoms and increase in insight into coping skills/future focused  Outcome: Progressing Towards Goal  Goal: *STG: Remains safe in hospital  Outcome: Progressing Towards Goal

## 2019-10-09 NOTE — BH NOTES
1900 - Assumed care of the patient. Patient up in day room interacting with staff and peers. Patient had family and friends in to visit. She requested PRN Tylenol for menstrual pain. It was effective. Patient denies SI/HI and AVH. Will continue to monitor for safety per unit policy.

## 2019-10-09 NOTE — PROGRESS NOTES
GROUP THERAPY PROGRESS NOTE      Marlene Breen was present for medication group. GROUP TIME: 45 minutes, Wednesdays 2pm    PERSONAL GOAL FOR PARTICIPATION: To be present for group, participate in discussion, and answer patient-directed questions. GOAL ORIENTATION: Personal    THERAPEUTIC INTERVENTIONS REVIEWED AND DISCUSSED: The following topics were presented: storage of medications, how to remember to refill medications and keep up with doctor appointments, relapse prevention, keeping a record of all medication including prescription and non-prescription drugs, and who to contact with medication questions. Patients were given time to ask questions regarding their current therapy. IMPRESSION OF PARTICIPATION: Sebastian Saunders was an active participant in group discussions and participated in medication BINGO.        Johanne Morris, MAXIMILIANOD, BCPS

## 2019-10-09 NOTE — PROGRESS NOTES
Problem: Depressed Mood (Adult/Pediatric)  Goal: *STG: Participates in treatment plan  Outcome: Progressing Towards Goal

## 2019-10-09 NOTE — BH NOTES
0730 Assumed care of pt.  0830 Pt. in day room for breakfast.  1030 Pt. in day room for spiritual group. 1230 Pt. eating lunch in day room. 1430 Pt. in day room for group. 1630 Pt. in day room for dinner. 1745 Pt. in day room coloring.

## 2019-10-09 NOTE — H&P
[facility] HISTORY AND PHYSICAL    Name:  Lizet Denny  MR#:  [id number]  :  2000  ACCOUNT #:  [de-identified]  ADMIT DATE:  10/07/2019    CHIEF COMPLAINT:  \"My therapist referred me to my psychiatrist who on the first evaluation referred me to inpatient for management of my condition. \"    HISTORY OF PRESENT ILLNESS:  This is a 17-year-old  female with history of depression from the [address] [place] [address], from [facility] in [place], [place], where she had prior hospitalization and treatment for depression and anxiety. She came to the [facility] with her  for his job and in the process of moving, tried to find a new therapist and coping with the change in location and situation she felt overwhelmed. She went to her therapist who referred her to a psychiatrist.  On the first evaluation, she described her week leading up to that evaluation where she had thoughts of suicide at one point. She was clearly overwhelmed with her situation and she describes that the provider recommended she come to the emergency room or come to the hospital to be stabilized before they start medications or continue with medications. So she came willingly. The patient, of course, is worrying about meetings, life issues, move, managing herself, her physical conditions. PAST PSYCHIATRIC HISTORY:  None. ALLERGIES:  NONE KNOWN. FAMILY HISTORY:  She does have a family history of depression and anxiety on her mother's side, sister and grandparents. There is also family history of cardiovascular disease and concerns her although she has no signs or symptoms at this time. She wants to be healthy to prevent these things from happening. SOCIAL HISTORY:  She is single. States she is dating her boyfriend who she came over to [facility] with in order to make a life together. They have no children. She does smoke marijuana daily. Occasional alcohol maybe socially once or twice a month.   No cigarette abuse. She had a marijuana card from the [facility] and is kind of getting used to the changes in the [facility] .  Currently unemployed. Looking for a job. MENTAL STATUS EXAMINATION:  Young adult female, calm and cooperative. Clear,  coherent speech of average rate, volume and tone. Mood is anxious. Affect slightly bubbly but appropriately interactive, some tearfulness. She describes intermittently, but none at this time, denies suicidal or homicidal ideations. No auditory or visual hallucinations. Aware of her surrounding, location and situation. Here for management of her condition. DIAGNOSIS:  Generalized anxiety disorder, depression. PLAN:  Admit for safety and stabilization. Medication modification as needed. Celexa 10 mg a day. Group therapy, individual therapy. ESTIMATED LENGTH OF STAY:  Five to seven days. DISPOSITION:  Planning with case management. STRENGTHS:  Willingness for treatment. DISABILITIES:  [facility] mentality on the [address] [place] [address]  Leroy Lozano MD      [id number]  D:  10/09/2019 1:56  T:  10/09/2019 7:12  JOB #:  [id number]

## 2019-10-09 NOTE — GROUP NOTE
SHANE  GROUP DOCUMENTATION INDIVIDUAL Group Therapy Note Date: October 9 Group Start Time: 7266 Group End Time: 0400 Group Topic: Recreational/Music Therapy [facility] [place] [address] ACUTE BEHAV [facility], [address] GROUP DOCUMENTATION GROUP Group Therapy Note Attendees: 7 Attendance: Did not attend Patient's Goal: Interventions/techniques Trevor Anglin

## 2019-10-10 VITALS
BODY MASS INDEX: 25.68 KG/M2 | WEIGHT: 136 LBS | OXYGEN SATURATION: 100 % | HEIGHT: 61 IN | DIASTOLIC BLOOD PRESSURE: 61 MMHG | RESPIRATION RATE: 18 BRPM | HEART RATE: 64 BPM | TEMPERATURE: 98.3 F | SYSTOLIC BLOOD PRESSURE: 104 MMHG

## 2019-10-10 PROCEDURE — 74011250636 HC RX REV CODE- 250/636: Performed by: PSYCHIATRY & NEUROLOGY

## 2019-10-10 PROCEDURE — 90471 IMMUNIZATION ADMIN: CPT

## 2019-10-10 PROCEDURE — 74011250637 HC RX REV CODE- 250/637: Performed by: PSYCHIATRY & NEUROLOGY

## 2019-10-10 PROCEDURE — 90686 IIV4 VACC NO PRSV 0.5 ML IM: CPT | Performed by: PSYCHIATRY & NEUROLOGY

## 2019-10-10 RX ORDER — TRAZODONE HYDROCHLORIDE 50 MG/1
50 TABLET ORAL
Qty: 30 TAB | Refills: 0 | Status: SHIPPED | OUTPATIENT
Start: 2019-10-10 | End: 2022-03-07 | Stop reason: ALTCHOICE

## 2019-10-10 RX ORDER — CITALOPRAM 10 MG/1
10 TABLET ORAL DAILY
Qty: 30 TAB | Refills: 0 | Status: SHIPPED | OUTPATIENT
Start: 2019-10-11 | End: 2022-03-07 | Stop reason: ALTCHOICE

## 2019-10-10 RX ADMIN — CITALOPRAM HYDROBROMIDE 10 MG: 20 TABLET ORAL at 08:46

## 2019-10-10 RX ADMIN — INFLUENZA VIRUS VACCINE 0.5 ML: 15; 15; 15; 15 SUSPENSION INTRAMUSCULAR at 12:13

## 2019-10-10 NOTE — DISCHARGE INSTRUCTIONS
Patient Education        Learning About Anxiety Disorders  What are anxiety disorders? Anxiety disorders are a type of medical problem. They cause severe anxiety. When you feel anxious, you feel that something bad is about to happen. This feeling interferes with your life. These disorders include:  · Generalized anxiety disorder. You feel worried and stressed about many everyday events and activities. This goes on for several months and disrupts your life on most days. · Panic disorder. You have repeated panic attacks. A panic attack is a sudden, intense fear or anxiety. It may make you feel short of breath. Your heart may pound. · Social anxiety disorder. You feel very anxious about what you will say or do in front of people. For example, you may be scared to talk or eat in public. This problem affects your daily life. · Phobias. You are very scared of a specific object, situation, or activity. For example, you may fear spiders, high places, or small spaces. What are the symptoms? Generalized anxiety disorder  Symptoms may include:  · Feeling worried and stressed about many things almost every day. · Feeling tired or irritable. You may have a hard time concentrating. · Having headaches or muscle aches. · Having a hard time getting to sleep or staying asleep. Panic disorder  You may have repeated panic attacks when there is no reason for feeling afraid. You may change your daily activities because you worry that you will have another attack. Symptoms may include:  · Intense fear, terror, or anxiety. · Trouble breathing or very fast breathing. · Chest pain or tightness. · A heartbeat that races or is not regular. Social anxiety disorder  Symptoms may include:  · Fear about a social situation, such as eating in front of others or speaking in public. You may worry a lot. Or you may be afraid that something bad will happen. · Anxiety that can cause you to blush, sweat, and feel shaky.   · A heartbeat that is faster than normal.  · A hard time focusing. Phobias  Symptoms may include:  · More fear than most people of being around an object, being in a situation, or doing an activity. You might also be stressed about the chance of being around the thing you fear. · Worry about losing control, panicking, fainting, or having physical symptoms like a faster heartbeat when you are around the situation or object. How are these disorders treated? Anxiety disorders can be treated with medicines or counseling. A combination of both may be used. Medicines may include:  · Antidepressants. These may help your symptoms by keeping chemicals in your brain in balance. · Benzodiazepines. These may give you short-term relief of your symptoms. Some people use cognitive-behavioral therapy. A therapist helps you learn to change stressful or bad thoughts into helpful thoughts. Lead a healthy lifestyle  A healthy lifestyle may help you feel better. · Get at least 30 minutes of exercise on most days of the week. Walking is a good choice. · Eat a healthy diet. Include fruits, vegetables, lean proteins, and whole grains in your diet each day. · Try to go to bed at the same time every night. Try for 8 hours of sleep a night. · Find ways to manage stress. Try relaxation exercises. · Avoid alcohol and illegal drugs. Follow-up care is a key part of your treatment and safety. Be sure to make and go to all appointments, and call your doctor if you are having problems. It's also a good idea to know your test results and keep a list of the medicines you take. Where can you learn more? Go to http://jez-savana.info/. Enter N487 in the search box to learn more about \"Learning About Anxiety Disorders. \"  Current as of: May 28, 2019  Content Version: 12.2  © 1127-0616 [employer].  Care instructions adapted under license by [employer] (which disclaims liability or warranty for this information). If you have questions about a medical condition or this instruction, always ask your healthcare professional. [place] [address] any warranty or liability for your use of this information. If I feel I am at risk of hurting myself or others, I will call the crisis office and speak with a crisis worker who will assist me during my crisis. [address]  [phone]  [address] [facility] [address] [phone]  [address]  [phone]          Patient Education        Learning About Depression Screening  What is depression screening? Depression screening is a way to see if you have depression symptoms. It may be done by a doctor or counselor. This screening is often part of a routine checkup. That's because your mental health is just as important as your physical health. Depression is a medical illness that affects how you feel, think, and act. You may:  · Have less energy. · Lose interest in your daily activities. · Feel sad and grouchy for a long time. Depression is very common. It affects men and women of all ages. Many things can trigger depression. Some people become depressed after they have a stroke or find out they have a major illness like cancer or heart disease. The death of a loved one, a breakup, or changes in the natural brain chemicals may lead to depression. It can run in families. Most experts believe that a combination of family history (a person's genes) and stressful life events can cause depression. What happens during screening? Your doctor may ask about your feelings, any changes in eating habits, your energy level, and your interest in your daily tasks. He or she may ask other questions, such as how well you are sleeping and if you can focus on the things you do. This may be an informal talk between the two of you.  Or your doctor may ask you to fill out a quick form and then talk about your answers. Some diseases or changes in your body can cause symptoms that look like depression. So your doctor may do blood tests to help rule out other problems, such as hormone changes, a low thyroid level, or anemia. What happens after screening? If you have signs of depression, your doctor will talk to you about your options. Doctors usually treat depression with medicines or counseling. Often, combining the two works best. Many people don't get help because they think that they'll get over the depression on their own. But people with depression may not get better unless they get treatment. Many people feel embarrassed or ashamed about having depression. But it isn't a sign of personal weakness. It's not a character flaw. A person who is depressed is not \"crazy. \" Depression is caused by changes in the brain. A serious symptom of depression is thinking about death or suicide. If you or someone you care about talks about this or about feeling hopeless, get help right away. It's important to know that depression can be treated. The first step toward feeling better is often just seeing that the problem exists. Where can you learn more? Go to http://jez-savana.info/. Enter T185 in the search box to learn more about \"Learning About Depression Screening. \"  Current as of: May 28, 2019  Content Version: 12.2  © 6674-3283 [employer]. Care instructions adapted under license by [employer] (which disclaims liability or warranty for this information). If you have questions about a medical condition or this instruction, always ask your healthcare professional. [place] [address] any warranty or liability for your use of this information.

## 2019-10-10 NOTE — BH NOTES
Behavioral Health Transition Record to Provider    Patient Name: Luke Tom  YOB: 2000  Medical Record Number: [id number]  Date of Admission: 10/7/2019  Date of Discharge: 10-10-19    Attending Provider: Camilla Faulkner MD  Discharging Provider: Camilla Faulkner MD  To contact this individual call [phone] and ask the  to page. If unavailable, ask to be transferred to [facility] Provider on call. [facility] Provider will be available on call 24/7 and during holidays. Primary Care Provider: Omar Koch MD    No Known Allergies    Reason for Admission: increased depression, SI, and anxiety    Admission Diagnosis: Depressive disorder [F32.9]    * No surgery found *    Results for orders placed or performed during the hospital encounter of 10/07/19   URINE CULTURE HOLD SAMPLE   Result Value Ref Range    Urine culture hold        URINE ON HOLD IN MICROBIOLOGY DEPT FOR 3 DAYS. IF UNPRESERVED URINE IS SUBMITTED, IT CANNOT BE USED FOR ADDITIONAL TESTING AFTER 24 HRS, RECOLLECTION WILL BE REQUIRED. CBC WITH AUTOMATED DIFF   Result Value Ref Range    WBC 10.0 3.6 - 11.0 K/uL    RBC 4.43 3.80 - 5.20 M/uL    HGB 12.5 11.5 - 16.0 g/dL    HCT 39.5 35.0 - 47.0 %    MCV 89.2 80.0 - 99.0 FL    MCH 28.2 26.0 - 34.0 PG    MCHC 31.6 30.0 - 36.5 g/dL    RDW 14.3 11.5 - 14.5 %    PLATELET 814 684 - 073 K/uL    MPV 10.9 8.9 - 12.9 FL    NRBC 0.0 0  WBC    ABSOLUTE NRBC 0.00 0.00 - 0.01 K/uL    NEUTROPHILS 79 (H) 32 - 75 %    LYMPHOCYTES 17 12 - 49 %    MONOCYTES 4 (L) 5 - 13 %    EOSINOPHILS 0 0 - 7 %    BASOPHILS 0 0 - 1 %    IMMATURE GRANULOCYTES 0 0.0 - 0.5 %    ABS. NEUTROPHILS 7.8 1.8 - 8.0 K/UL    ABS. LYMPHOCYTES 1.7 0.8 - 3.5 K/UL    ABS. MONOCYTES 0.4 0.0 - 1.0 K/UL    ABS. EOSINOPHILS 0.0 0.0 - 0.4 K/UL    ABS. BASOPHILS 0.0 0.0 - 0.1 K/UL    ABS. IMM.  GRANS. 0.0 0.00 - 0.04 K/UL    DF AUTOMATED     METABOLIC PANEL, COMPREHENSIVE   Result Value Ref Range Sodium 138 136 - 145 mmol/L    Potassium 3.6 3.5 - 5.1 mmol/L    Chloride 106 97 - 108 mmol/L    CO2 24 21 - 32 mmol/L    Anion gap 8 5 - 15 mmol/L    Glucose 117 (H) 65 - 100 mg/dL    BUN 10 6 - 20 MG/DL    Creatinine 0.70 0.55 - 1.02 MG/DL    BUN/Creatinine ratio 14 12 - 20      GFR est AA >60 >60 ml/min/1.73m2    GFR est non-AA >60 >60 ml/min/1.73m2    Calcium 9.6 8.5 - 10.1 MG/DL    Bilirubin, total 0.2 0.2 - 1.0 MG/DL    ALT (SGPT) 22 12 - 78 U/L    AST (SGOT) 7 (L) 15 - 37 U/L    Alk. phosphatase 66 45 - 117 U/L    Protein, total 8.0 6.4 - 8.2 g/dL    Albumin 4.2 3.5 - 5.0 g/dL    Globulin 3.8 2.0 - 4.0 g/dL    A-G Ratio 1.1 1.1 - 2.2     SAMPLES BEING HELD   Result Value Ref Range    SAMPLES BEING HELD 1blu     COMMENT        Add-on orders for these samples will be processed based on acceptable specimen integrity and analyte stability, which may vary by analyte.    ACETAMINOPHEN   Result Value Ref Range    Acetaminophen level <2 (L) 10 - 30 ug/mL   SALICYLATE   Result Value Ref Range    Salicylate level <2.5 (L) 2.8 - 20.0 MG/DL   ETHYL ALCOHOL   Result Value Ref Range    ALCOHOL(ETHYL),SERUM <10 <10 MG/DL   URINALYSIS W/MICROSCOPIC   Result Value Ref Range    Color YELLOW/STRAW      Appearance CLEAR CLEAR      Specific gravity 1.019 1.003 - 1.030      pH (UA) 6.5 5.0 - 8.0      Protein NEGATIVE  NEG mg/dL    Glucose NEGATIVE  NEG mg/dL    Ketone 15 (A) NEG mg/dL    Bilirubin NEGATIVE  NEG      Blood SMALL (A) NEG      Urobilinogen 0.2 0.2 - 1.0 EU/dL    Nitrites NEGATIVE  NEG      Leukocyte Esterase NEGATIVE  NEG      WBC 0-4 0 - 4 /hpf    RBC 0-5 0 - 5 /hpf    Epithelial cells FEW FEW /lpf    Bacteria NEGATIVE  NEG /hpf    Hyaline cast 0-2 0 - 5 /lpf   DRUG SCREEN, URINE   Result Value Ref Range    AMPHETAMINES NEGATIVE  NEG      BARBITURATES NEGATIVE  NEG      BENZODIAZEPINES NEGATIVE  NEG      COCAINE NEGATIVE  NEG      METHADONE NEGATIVE  NEG      OPIATES NEGATIVE  NEG      PCP(PHENCYCLIDINE) NEGATIVE NEG      THC (TH-CANNABINOL) POSITIVE (A) NEG      Drug screen comment (NOTE)    HCG URINE, QL. - POC   Result Value Ref Range    Pregnancy test,urine (POC) NEGATIVE  NEG         Immunizations administered during this encounter: There is no immunization history for the selected administration types on file for this patient. Screening for Metabolic Disorders for Patients on Antipsychotic Medications  (Data obtained from the EMR)    Estimated Body Mass Index  Estimated body mass index is 25.7 kg/m² as calculated from the following:    Height as of this encounter: 5' 1\" (1.549 m). Weight as of this encounter: 61.7 kg (136 lb). Vital Signs/Blood Pressure  Visit Vitals  /61   Pulse 64   Temp 98.3 °F (36.8 °C)   Resp 18   Ht 5' 1\" (1.549 m)   Wt 61.7 kg (136 lb)   SpO2 100%   Breastfeeding? No   BMI 25.70 kg/m²       Blood Glucose/Hemoglobin A1c  Lab Results   Component Value Date/Time    Glucose 117 (H) 10/07/2019 06:09 PM       No results found for: HBA1C, HGBE8, WSJ5BSOR     Lipid Panel  No results found for: CHOL, CHOLX, CHLST, CHOLV, 410122, HDL, HDLP, LDL, LDLC, DLDLP, TGLX, TRIGL, TRIGP, CHHD, CHHDX     Discharge Diagnosis: Depressive disorder                                               Generalized anxiety disorder    Discharge Plan:Discharge home with boyfriend and follow up at [facility]. Discharge Medication List and Instructions:   Current Discharge Medication List      START taking these medications    Details   citalopram (CELEXA) 10 mg tablet Take 1 Tab by mouth daily. Indications: Anxiousness associated with Depression  Qty: 30 Tab, Refills: 0      traZODone (DESYREL) 50 mg tablet Take 1 Tab by mouth nightly as needed for Sleep (For insomnia).  Indications: insomnia associated with depression  Qty: 30 Tab, Refills: 0         STOP taking these medications       ibuprofen (MOTRIN) 200 mg tablet Comments:   Reason for Stopping:               Unresulted Labs (24h ago, onward)    None To obtain results of studies pending at discharge, please contact  [phone]    Follow-up Information     Follow up With Specialties Details [address] [place] [address] [address] [place], [address]  [place]  See Dr Lavern Oliveira  10-14-19 12:30p  See Nelly Talley [phone] 1:00p      Elena Alejandra MD Internal Medicine             Advanced Directive:   Does the patient have an appointed surrogate decision maker? No  Does the patient have a Medical Advance Directive? No  Does the patient have a Psychiatric Advance Directive? No  If the patient does not have a surrogate or Medical Advance Directive AND Psychiatric Advance Directive, the patient was offered information on these advance directives       Patient Instructions: Please take all  medications until otherwise directed by physician. Tobacco Cessation Discharge Plan:   Is the patient a smoker and needs referral for smoking cessation? Not applicable  Patient referred to the following for smoking cessation with an appointment? Not applicable     Patient was offered medication to assist with smoking cessation at discharge? Not applicable  Was education for smoking cessation added to the discharge instructions? Not applicable    Alcohol/Substance Abuse Discharge Plan:   Does the patient have a history of substance/alcohol abuse and requires a referral for treatment? Refused  Patient referred to the following for substance/alcohol abuse treatment with an appointment? No  Patient was offered medication to assist with alcohol cessation at discharge? No  Was education for substance/alcohol abuse added to discharge instructions? No    Patient discharged to home.

## 2019-10-10 NOTE — BH NOTES
1930 Assumed care of patient from day shift nurse. Patient affect is bright. Sitting in the day room with her visitors. Patient is cooperative. Denies SI, HI and AVH at this time. Will continue to monitor patient. 0600 Patient slept for 8hrs this shift.

## 2019-10-10 NOTE — DISCHARGE SUMMARY
PSYCHIATRIC DISCHARGE SUMMARY         IDENTIFICATION:    Patient Name  David Green   Date of Birth 2000   [facility] [id number]   Medical Record Number  [id number]      Age  19 y.o. PCP Prema Spain MD   Admit date:  10/7/2019    Discharge date: 10/10/2019   Room Number  320/02  @ [facility]   Date of Service  10/10/2019            TYPE OF DISCHARGE: REGULAR               CONDITION AT DISCHARGE: improved       PROVISIONAL & DISCHARGE DIAGNOSES:    Problem List  Never Reviewed          Codes Class    Depressive disorder ICD-10-CM: F32.9  ICD-9-CM: 342         * (Principal) Generalized anxiety disorder ICD-10-CM: F41.1  ICD-9-CM: 300.02               Active Hospital Problems    Depressive disorder      *Generalized anxiety disorder        DISCHARGE DIAGNOSIS:   Axis I:  SEE ABOVE  Axis II: SEE ABOVE  Axis III: SEE ABOVE  Axis IV:  lack of structure  Axis V:  <50 on admission, 55+ on discharge     CC & HISTORY OF PRESENT ILLNESS:  78-year-old  female with history of depression from the [address] [place] [address], from [facility] in [place], [place], where she had prior hospitalization and treatment for depression and anxiety. She came to the [facility] with her  for his job and in the process of moving, tried to find a new therapist and coping with the change in location and situation she felt overwhelmed. She went to her therapist who referred her to a psychiatrist.  On the first evaluation, she described her week leading up to that evaluation where she had thoughts of suicide at one point. She was clearly overwhelmed with her situation and she describes that the provider recommended she come to the emergency room or come to the hospital to be stabilized before they start medications or continue with medications. So she came willingly. The patient, of course, is worrying about meetings, life issues, move, managing herself, her physical conditions.      SOCIAL HISTORY: Social History     Socioeconomic History    Marital status: SINGLE     Spouse name: Not on file    Number of children: Not on file    Years of education: Not on file    Highest education level: Not on file   Occupational History    Not on file   Social Needs    Financial resource strain: Not on file    Food insecurity:     Worry: Not on file     Inability: Not on file    Transportation needs:     Medical: Not on file     Non-medical: Not on file   Tobacco Use    Smoking status: Not on file   Substance and Sexual Activity    Alcohol use: Not on file    Drug use: Not on file    Sexual activity: Not on file   Lifestyle    Physical activity:     Days per week: Not on file     Minutes per session: Not on file    Stress: Not on file   Relationships    Social connections:     Talks on phone: Not on file     Gets together: Not on file     Attends Zoroastrianism service: Not on file     Active member of club or organization: Not on file     Attends meetings of clubs or organizations: Not on file     Relationship status: Not on file    Intimate partner violence:     Fear of current or ex partner: Not on file     Emotionally abused: Not on file     Physically abused: Not on file     Forced sexual activity: Not on file   Other Topics Concern    Not on file   Social History Narrative    Not on file      FAMILY HISTORY:   No family history on file. HOSPITALIZATION COURSE:    Rusty Herrera was admitted to the inpatient psychiatric unit [facility] for acute psychiatric stabilization in regards to symptomatology as described in the HPI above. The differential diagnosis at time of admission included: schizophrenia vs substance induced psychotic disorder schizoaffective vs bipolar MDD vs adjustment disorder. While on the unit Rusty Herrera was involved in individual, group, occupational and milieu therapy. Psychiatric medications were adjusted during this hospitalization.   Rusty Herrera demonstrated a progressive improvement in overall condition. Much of patient's initial presentation appeared to be related to situational stressors, effects of psychological factors. Please see individual progress notes for more specific details regarding patient's hospitalization course. Robbie Chopra reports feeling well and moods are good. Denies SI/HI/AH/VH. No aggression or violence. Appropriately interactive and aware. Tolerating medications well. Eating and sleeping fairly. Requesting to leave today. There are no grounds to seek a TDO. At time of discharge, Chu Choi is without significant problems of depression, psychosis, or mickey. Patient free of suicidal and homicidal ideations (appears to be at very low risk of suicide or homicide) and reports many positive predictive factors in terms of not attempting suicide or homicide. Overall presentation at time of discharge is most consistent with the diagnosis of Generalized Anxiety disorder with depression. Patient has maximized benefit to be derived from acute inpatient psychiatric treatment. All members of the treatment team concur with each other in regards to plans for discharge today. Patient and family are aware and in agreement with discharge and discharge plan.          LABS AND IMAGAING:    Labs Reviewed - No data to display  No results found for: DS35, PHEN, PHENO, PHENT, DILF, DS39, PHENY, PTN, VALF2, VALAC, VALP, VALPR, DS6, CRBAM, CRBAMP, CARB2, XCRBAM  Admission on 10/07/2019, Discharged on 10/07/2019   Component Date Value Ref Range Status    WBC 10/07/2019 10.0  3.6 - 11.0 K/uL Final    RBC 10/07/2019 4.43  3.80 - 5.20 M/uL Final    HGB 10/07/2019 12.5  11.5 - 16.0 g/dL Final    HCT 10/07/2019 39.5  35.0 - 47.0 % Final    MCV 10/07/2019 89.2  80.0 - 99.0 FL Final    MCH 10/07/2019 28.2  26.0 - 34.0 PG Final    MCHC 10/07/2019 31.6  30.0 - 36.5 g/dL Final    RDW 10/07/2019 14.3  11.5 - 14.5 % Final    PLATELET 88/89/2057 035  150 - 400 K/uL Final    MPV 10/07/2019 10.9  8.9 - 12.9 FL Final    NRBC 10/07/2019 0.0  0  WBC Final    ABSOLUTE NRBC 10/07/2019 0.00  0.00 - 0.01 K/uL Final    NEUTROPHILS 10/07/2019 79* 32 - 75 % Final    LYMPHOCYTES 10/07/2019 17  12 - 49 % Final    MONOCYTES 10/07/2019 4* 5 - 13 % Final    EOSINOPHILS 10/07/2019 0  0 - 7 % Final    BASOPHILS 10/07/2019 0  0 - 1 % Final    IMMATURE GRANULOCYTES 10/07/2019 0  0.0 - 0.5 % Final    ABS. NEUTROPHILS 10/07/2019 7.8  1.8 - 8.0 K/UL Final    ABS. LYMPHOCYTES 10/07/2019 1.7  0.8 - 3.5 K/UL Final    ABS. MONOCYTES 10/07/2019 0.4  0.0 - 1.0 K/UL Final    ABS. EOSINOPHILS 10/07/2019 0.0  0.0 - 0.4 K/UL Final    ABS. BASOPHILS 10/07/2019 0.0  0.0 - 0.1 K/UL Final    ABS. IMM. GRANS. 10/07/2019 0.0  0.00 - 0.04 K/UL Final    DF 10/07/2019 AUTOMATED    Final    Sodium 10/07/2019 138  136 - 145 mmol/L Final    Potassium 10/07/2019 3.6  3.5 - 5.1 mmol/L Final    Chloride 10/07/2019 106  97 - 108 mmol/L Final    CO2 10/07/2019 24  21 - 32 mmol/L Final    Anion gap 10/07/2019 8  5 - 15 mmol/L Final    Glucose 10/07/2019 117* 65 - 100 mg/dL Final    BUN 10/07/2019 10  6 - 20 MG/DL Final    Creatinine 10/07/2019 0.70  0.55 - 1.02 MG/DL Final    BUN/Creatinine ratio 10/07/2019 14  12 - 20   Final    GFR est AA 10/07/2019 >60  >60 ml/min/1.73m2 Final    GFR est non-AA 10/07/2019 >60  >60 ml/min/1.73m2 Final    Calcium 10/07/2019 9.6  8.5 - 10.1 MG/DL Final    Bilirubin, total 10/07/2019 0.2  0.2 - 1.0 MG/DL Final    ALT (SGPT) 10/07/2019 22  12 - 78 U/L Final    AST (SGOT) 10/07/2019 7* 15 - 37 U/L Final    Alk.  phosphatase 10/07/2019 66  45 - 117 U/L Final    Protein, total 10/07/2019 8.0  6.4 - 8.2 g/dL Final    Albumin 10/07/2019 4.2  3.5 - 5.0 g/dL Final    Globulin 10/07/2019 3.8  2.0 - 4.0 g/dL Final    A-G Ratio 10/07/2019 1.1  1.1 - 2.2   Final    SAMPLES BEING HELD 10/07/2019 1blu   Final    COMMENT 10/07/2019 Add-on orders for these samples will be processed based on acceptable specimen integrity and analyte stability, which may vary by analyte. Final    Acetaminophen level 10/07/2019 <2* 10 - 30 ug/mL Final    Salicylate level 90/97/0326 <1.7* 2.8 - 20.0 MG/DL Final    ALCOHOL(ETHYL),SERUM 10/07/2019 <10  <10 MG/DL Final    Color 10/07/2019 YELLOW/STRAW    Final    Appearance 10/07/2019 CLEAR  CLEAR   Final    Specific gravity 10/07/2019 1.019  1.003 - 1.030   Final    pH (UA) 10/07/2019 6.5  5.0 - 8.0   Final    Protein 10/07/2019 NEGATIVE   NEG mg/dL Final    Glucose 10/07/2019 NEGATIVE   NEG mg/dL Final    Ketone 10/07/2019 15* NEG mg/dL Final    Bilirubin 10/07/2019 NEGATIVE   NEG   Final    Blood 10/07/2019 SMALL* NEG   Final    Urobilinogen 10/07/2019 0.2  0.2 - 1.0 EU/dL Final    Nitrites 10/07/2019 NEGATIVE   NEG   Final    Leukocyte Esterase 10/07/2019 NEGATIVE   NEG   Final    WBC 10/07/2019 0-4  0 - 4 /hpf Final    RBC 10/07/2019 0-5  0 - 5 /hpf Final    Epithelial cells 10/07/2019 FEW  FEW /lpf Final    Bacteria 10/07/2019 NEGATIVE   NEG /hpf Final    Hyaline cast 10/07/2019 0-2  0 - 5 /lpf Final    Urine culture hold 10/07/2019 URINE ON HOLD IN MICROBIOLOGY DEPT FOR 3 DAYS. IF UNPRESERVED URINE IS SUBMITTED, IT CANNOT BE USED FOR ADDITIONAL TESTING AFTER 24 HRS, RECOLLECTION WILL BE REQUIRED. Final    AMPHETAMINES 10/07/2019 NEGATIVE   NEG   Final    BARBITURATES 10/07/2019 NEGATIVE   NEG   Final    BENZODIAZEPINES 10/07/2019 NEGATIVE   NEG   Final    COCAINE 10/07/2019 NEGATIVE   NEG   Final    METHADONE 10/07/2019 NEGATIVE   NEG   Final    OPIATES 10/07/2019 NEGATIVE   NEG   Final    PCP(PHENCYCLIDINE) 10/07/2019 NEGATIVE   NEG   Final    THC (TH-CANNABINOL) 10/07/2019 POSITIVE* NEG   Final    Drug screen comment 10/07/2019 (NOTE)   Final    Pregnancy test,urine (POC) 10/07/2019 NEGATIVE   NEG   Final     No results found. DISPOSITION:    Home.  Patient to f/u with  psychiatric, and psychotherapy appointments. Patient is to f/u with internist as directed. FOLLOW-UP CARE:    Activity as tolerated  Regular diet  Wound Care: none needed. Follow-up Information     Follow up With Specialties Details [address] [place] [address] [phone] [place], [address]  [place]  See Dr Maryjane Garcia  10-14-19 12:30p  See Desireejeremías Antonios 38-11-04 1:00p      Herminia Patel MD Internal Medicine                    PROGNOSIS:   Rick Whelan ---- based on nature of patient's pathology/ies and treatment compliance issues. Prognosis is greatly dependent upon patient's ability to remain sober and to follow up with scheduled appointments as well as to comply with psychiatric medications as prescribed. DISCHARGE MEDICATIONS:     Informed consent given for the use of following psychotropic medications:  Current Discharge Medication List      START taking these medications    Details   citalopram (CELEXA) 10 mg tablet Take 1 Tab by mouth daily. Indications: Anxiousness associated with Depression  Qty: 30 Tab, Refills: 0      traZODone (DESYREL) 50 mg tablet Take 1 Tab by mouth nightly as needed for Sleep (For insomnia). Indications: insomnia associated with depression  Qty: 30 Tab, Refills: 0         STOP taking these medications       ibuprofen (MOTRIN) 200 mg tablet Comments:   Reason for Stopping:                      A coordinated, multidisplinary treatment team round was conducted with Sandrine Cronin---this is done daily here at [facility]. This team consists of the nurse, psychiatric unit pharmacist,  and Danny Mckeon. I have spent greater than 35 minutes on discharge work.     Signed:  Clarita Miller MD  10/10/2019

## 2019-10-10 NOTE — PROGRESS NOTES
Problem: Falls - Risk of  Goal: *Absence of Falls  Description  Document Frances Moises Fall Risk and appropriate interventions in the flowsheet.   Outcome: Resolved/Met  Note:   Fall Risk Interventions:       Mentation Interventions: Adequate sleep, hydration, pain control, Update white board    Medication Interventions: Teach patient to arise slowly                   Problem: Patient Education: Go to Patient Education Activity  Goal: Patient/Family Education  Outcome: Resolved/Met     Problem: Depressed Mood (Adult/Pediatric)  Goal: *STG: Participates in treatment plan  Outcome: Resolved/Met  Goal: *STG: Verbalizes anger, guilt, and other feelings in a constructive manor  Outcome: Resolved/Met  Goal: *STG: Attends activities and groups  Outcome: Resolved/Met  Goal: *STG: Remains safe in hospital  Outcome: Resolved/Met  Goal: *STG: Complies with medication therapy  Outcome: Resolved/Met  Goal: *LTG: Understands illness and can identify signs of relapse  Outcome: Resolved/Met

## 2019-10-10 NOTE — BH NOTES
[phone]  Patient report received from off going shift nurse. Patient has been calm, cooperative and pleasant today. Med compliant. Denies si/hi/avh and has attended the day room for group and meals. Patient received her flu vaccine. Hourly rounds completed. Discharge orders completed.  Patient left unit at 1430

## 2019-10-14 NOTE — GROUP NOTE
SHANE  GROUP DOCUMENTATION INDIVIDUAL Group Therapy Note Date: October 8 Group Start Time: 1100 Group End Time: 1200 Group Topic: Topic Group [facility] 3 ACUTE BEHAV [facility] [place], [facility] GROUP DOCUMENTATION GROUP Group Therapy Note Attendees: 7 Attendance: Did not attend Patient's Goal: Interventions/techniquesSia Edwards

## 2019-11-22 ENCOUNTER — OFFICE VISIT (OUTPATIENT)
Dept: FAMILY MEDICINE CLINIC | Age: 19
End: 2019-11-22

## 2019-11-22 VITALS
TEMPERATURE: 98.6 F | BODY MASS INDEX: 25.11 KG/M2 | OXYGEN SATURATION: 97 % | HEART RATE: 62 BPM | SYSTOLIC BLOOD PRESSURE: 108 MMHG | HEIGHT: 61 IN | WEIGHT: 133 LBS | RESPIRATION RATE: 14 BRPM | DIASTOLIC BLOOD PRESSURE: 73 MMHG

## 2019-11-22 DIAGNOSIS — Z30.9 ENCOUNTER FOR CONTRACEPTIVE MANAGEMENT, UNSPECIFIED TYPE: Primary | ICD-10-CM

## 2019-11-22 NOTE — PROGRESS NOTES
Chief Complaint   Patient presents with   [address]     Pt in office today to establish care  -pt would like to be referred to obgyn for IUD  -pt states she was hospitalized for suicidal thoughts. Pt states she is better and she is on celexa    1. Have you been to the ER, urgent care clinic since your last visit? Hospitalized since your last visit? [facility] psych rose    2. Have you seen or consulted any other health care providers outside of the [address] [place] [address] since your last visit? Include any pap smears or colon screening.  No

## 2019-11-25 NOTE — PROGRESS NOTES
HISTORY OF PRESENT ILLNESS  Griselda Dock is a 22 y.o. female. HPI  Pt in office today to establish care  -pt would like to be referred to obgyn for IUD  -pt states she was hospitalized for suicidal thoughts. Pt states she is better and she is on celexa    ROS  A comprehensive review of system was obtained and negative except findings in the HPI    Visit Vitals  /73 (BP 1 Location: Left arm, BP Patient Position: Sitting)   Pulse 62   Temp 98.6 °F (37 °C) (Oral)   Resp 14   Ht 5' 1\" (1.549 m)   Wt 133 lb (60.3 kg)   LMP 11/18/2019   SpO2 97%   BMI 25.13 kg/m²     Physical Exam  Vitals signs and nursing note reviewed. Constitutional:       Appearance: Normal appearance. Cardiovascular:      Rate and Rhythm: Normal rate and regular rhythm. Musculoskeletal:         General: No swelling. Neurological:      Mental Status: She is alert. ASSESSMENT and PLAN  Encounter Diagnoses   Name Primary?  Encounter for contraceptive management, unspecified type Yes     Orders Placed This Encounter    REFERRAL TO OBSTETRICS AND GYNECOLOGY     Referral to gyn for eval  I have discussed the diagnosis with the patient and the intended plan as seen in the above orders. The patient has received an after-visit summary and questions were answered concerning future plans. Patient conveyed understanding of the plan at the time of the visit.     Lobo Massey, MSN, ANP  11/24/2019

## 2020-07-30 ENCOUNTER — HOSPITAL ENCOUNTER (EMERGENCY)
Age: 20
Discharge: HOME | End: 2020-07-30
Attending: EMERGENCY MEDICINE
Payer: COMMERCIAL

## 2020-07-30 VITALS
BODY MASS INDEX: 24.34 KG/M2 | WEIGHT: 137.35 LBS | DIASTOLIC BLOOD PRESSURE: 95 MMHG | RESPIRATION RATE: 18 BRPM | SYSTOLIC BLOOD PRESSURE: 133 MMHG | TEMPERATURE: 99 F | HEIGHT: 63 IN | HEART RATE: 83 BPM | OXYGEN SATURATION: 98 %

## 2020-07-30 DIAGNOSIS — R11.2 NON-INTRACTABLE VOMITING WITH NAUSEA, UNSPECIFIED VOMITING TYPE: ICD-10-CM

## 2020-07-30 DIAGNOSIS — T74.21XA SEXUAL ASSAULT OF ADULT, INITIAL ENCOUNTER: Primary | ICD-10-CM

## 2020-07-30 LAB
ALBUMIN SERPL-MCNC: 4.1 G/DL (ref 3.5–5)
ALBUMIN/GLOB SERPL: 1.2 {RATIO} (ref 1.1–2.2)
ALP SERPL-CCNC: 57 U/L (ref 45–117)
ALT SERPL-CCNC: 22 U/L (ref 12–78)
ANION GAP SERPL CALC-SCNC: 12 MMOL/L (ref 5–15)
APPEARANCE UR: CLEAR
AST SERPL-CCNC: 14 U/L (ref 15–37)
BACTERIA URNS QL MICRO: ABNORMAL /HPF
BASOPHILS # BLD: 0 K/UL (ref 0–0.1)
BASOPHILS NFR BLD: 0 % (ref 0–1)
BILIRUB SERPL-MCNC: 0.3 MG/DL (ref 0.2–1)
BILIRUB UR QL CFM: NEGATIVE
BUN SERPL-MCNC: 11 MG/DL (ref 6–20)
BUN/CREAT SERPL: 13 (ref 12–20)
CALCIUM SERPL-MCNC: 9.3 MG/DL (ref 8.5–10.1)
CHLORIDE SERPL-SCNC: 102 MMOL/L (ref 97–108)
CLUE CELLS VAG QL WET PREP: NORMAL
CO2 SERPL-SCNC: 26 MMOL/L (ref 21–32)
COLOR UR: ABNORMAL
COMMENT, HOLDF: NORMAL
CREAT SERPL-MCNC: 0.84 MG/DL (ref 0.55–1.02)
DIFFERENTIAL METHOD BLD: ABNORMAL
EOSINOPHIL # BLD: 0 K/UL (ref 0–0.4)
EOSINOPHIL NFR BLD: 0 % (ref 0–7)
EPITH CASTS URNS QL MICRO: ABNORMAL /LPF
ERYTHROCYTE [DISTWIDTH] IN BLOOD BY AUTOMATED COUNT: 14 % (ref 11.5–14.5)
GLOBULIN SER CALC-MCNC: 3.4 G/DL (ref 2–4)
GLUCOSE SERPL-MCNC: 128 MG/DL (ref 65–100)
GLUCOSE UR STRIP.AUTO-MCNC: NEGATIVE MG/DL
HBV SURFACE AB SER QL: NONREACTIVE
HBV SURFACE AB SER-ACNC: <3.1 MIU/ML
HBV SURFACE AG SER QL: <0.1 INDEX
HBV SURFACE AG SER QL: NEGATIVE
HCG UR QL: NEGATIVE
HCT VFR BLD AUTO: 39.6 % (ref 35–47)
HCV AB SERPL QL IA: NONREACTIVE
HCV COMMENT,HCGAC: NORMAL
HGB BLD-MCNC: 12.5 G/DL (ref 11.5–16)
HGB UR QL STRIP: NEGATIVE
HIV1 P24 AG SERPL QL IA: NONREACTIVE
HIV1+2 AB SERPL QL IA: NONREACTIVE
IMM GRANULOCYTES # BLD AUTO: 0.1 K/UL (ref 0–0.04)
IMM GRANULOCYTES NFR BLD AUTO: 1 % (ref 0–0.5)
KETONES UR QL STRIP.AUTO: 40 MG/DL
KOH PREP SPEC: NORMAL
LEUKOCYTE ESTERASE UR QL STRIP.AUTO: ABNORMAL
LYMPHOCYTES # BLD: 1.3 K/UL (ref 0.8–3.5)
LYMPHOCYTES NFR BLD: 13 % (ref 12–49)
MAGNESIUM SERPL-MCNC: 1.9 MG/DL (ref 1.6–2.4)
MCH RBC QN AUTO: 28.3 PG (ref 26–34)
MCHC RBC AUTO-ENTMCNC: 31.6 G/DL (ref 30–36.5)
MCV RBC AUTO: 89.6 FL (ref 80–99)
MONOCYTES # BLD: 0.4 K/UL (ref 0–1)
MONOCYTES NFR BLD: 4 % (ref 5–13)
MUCOUS THREADS URNS QL MICRO: ABNORMAL /LPF
NEUTS SEG # BLD: 7.9 K/UL (ref 1.8–8)
NEUTS SEG NFR BLD: 82 % (ref 32–75)
NITRITE UR QL STRIP.AUTO: NEGATIVE
NRBC # BLD: 0 K/UL (ref 0–0.01)
NRBC BLD-RTO: 0 PER 100 WBC
PH UR STRIP: 7.5 [PH] (ref 5–8)
PLATELET # BLD AUTO: 301 K/UL (ref 150–400)
PMV BLD AUTO: 11.3 FL (ref 8.9–12.9)
POTASSIUM SERPL-SCNC: 3.8 MMOL/L (ref 3.5–5.1)
PROT SERPL-MCNC: 7.5 G/DL (ref 6.4–8.2)
PROT UR STRIP-MCNC: 30 MG/DL
RBC # BLD AUTO: 4.42 M/UL (ref 3.8–5.2)
RBC #/AREA URNS HPF: ABNORMAL /HPF (ref 0–5)
SAMPLES BEING HELD,HOLD: NORMAL
SERVICE CMNT-IMP: NORMAL
SODIUM SERPL-SCNC: 140 MMOL/L (ref 136–145)
SP GR UR REFRACTOMETRY: 1.01 (ref 1–1.03)
T VAGINALIS VAG QL WET PREP: NORMAL
UR CULT HOLD, URHOLD: NORMAL
UROBILINOGEN UR QL STRIP.AUTO: 0.2 EU/DL (ref 0.2–1)
WBC # BLD AUTO: 9.7 K/UL (ref 3.6–11)
WBC URNS QL MICRO: ABNORMAL /HPF (ref 0–4)

## 2020-07-30 PROCEDURE — 74011250636 HC RX REV CODE- 250/636: Performed by: EMERGENCY MEDICINE

## 2020-07-30 PROCEDURE — 87491 CHLMYD TRACH DNA AMP PROBE: CPT

## 2020-07-30 PROCEDURE — 99284 EMERGENCY DEPT VISIT MOD MDM: CPT

## 2020-07-30 PROCEDURE — 87210 SMEAR WET MOUNT SALINE/INK: CPT

## 2020-07-30 PROCEDURE — 81025 URINE PREGNANCY TEST: CPT

## 2020-07-30 PROCEDURE — 86803 HEPATITIS C AB TEST: CPT

## 2020-07-30 PROCEDURE — 86706 HEP B SURFACE ANTIBODY: CPT

## 2020-07-30 PROCEDURE — 87389 HIV-1 AG W/HIV-1&-2 AB AG IA: CPT

## 2020-07-30 PROCEDURE — 96365 THER/PROPH/DIAG IV INF INIT: CPT

## 2020-07-30 PROCEDURE — 96375 TX/PRO/DX INJ NEW DRUG ADDON: CPT

## 2020-07-30 PROCEDURE — 87340 HEPATITIS B SURFACE AG IA: CPT

## 2020-07-30 PROCEDURE — 83735 ASSAY OF MAGNESIUM: CPT

## 2020-07-30 PROCEDURE — 86704 HEP B CORE ANTIBODY TOTAL: CPT

## 2020-07-30 PROCEDURE — 80053 COMPREHEN METABOLIC PANEL: CPT

## 2020-07-30 PROCEDURE — 86592 SYPHILIS TEST NON-TREP QUAL: CPT

## 2020-07-30 PROCEDURE — 85025 COMPLETE CBC W/AUTO DIFF WBC: CPT

## 2020-07-30 PROCEDURE — 81001 URINALYSIS AUTO W/SCOPE: CPT

## 2020-07-30 PROCEDURE — 74011250637 HC RX REV CODE- 250/637: Performed by: EMERGENCY MEDICINE

## 2020-07-30 PROCEDURE — 74011000258 HC RX REV CODE- 258: Performed by: EMERGENCY MEDICINE

## 2020-07-30 PROCEDURE — 36415 COLL VENOUS BLD VENIPUNCTURE: CPT

## 2020-07-30 RX ORDER — KETOROLAC TROMETHAMINE 30 MG/ML
30 INJECTION, SOLUTION INTRAMUSCULAR; INTRAVENOUS
Status: COMPLETED | OUTPATIENT
Start: 2020-07-30 | End: 2020-07-30

## 2020-07-30 RX ORDER — EMTRICITABINE AND TENOFOVIR DISOPROXIL FUMARATE 200; 300 MG/1; MG/1
1 TABLET, FILM COATED ORAL DAILY
Qty: 25 TAB | Refills: 0 | Status: SHIPPED | OUTPATIENT
Start: 2020-07-30 | End: 2020-08-24

## 2020-07-30 RX ORDER — LEVONORGESTREL 1.5 MG/1
1.5 TABLET ORAL ONCE
Status: COMPLETED | OUTPATIENT
Start: 2020-07-30 | End: 2020-07-30

## 2020-07-30 RX ORDER — ONDANSETRON 4 MG/1
4 TABLET, ORALLY DISINTEGRATING ORAL
Qty: 90 TAB | Refills: 0 | Status: SHIPPED | OUTPATIENT
Start: 2020-07-30 | End: 2020-07-30

## 2020-07-30 RX ORDER — ONDANSETRON 4 MG/1
4 TABLET, ORALLY DISINTEGRATING ORAL
Qty: 12 TAB | Refills: 0 | Status: SHIPPED | OUTPATIENT
Start: 2020-07-30 | End: 2020-08-29

## 2020-07-30 RX ORDER — AZITHROMYCIN 250 MG/1
1000 TABLET, FILM COATED ORAL ONCE
Status: COMPLETED | OUTPATIENT
Start: 2020-07-30 | End: 2020-07-30

## 2020-07-30 RX ORDER — ONDANSETRON 2 MG/ML
4 INJECTION INTRAMUSCULAR; INTRAVENOUS
Status: COMPLETED | OUTPATIENT
Start: 2020-07-30 | End: 2020-07-30

## 2020-07-30 RX ADMIN — KETOROLAC TROMETHAMINE 30 MG: 30 INJECTION, SOLUTION INTRAMUSCULAR at 16:26

## 2020-07-30 RX ADMIN — AZITHROMYCIN MONOHYDRATE 1000 MG: 250 TABLET ORAL at 21:23

## 2020-07-30 RX ADMIN — LEVONORGESTREL 1.5 MG: 1.5 TABLET ORAL at 21:23

## 2020-07-30 RX ADMIN — SODIUM CHLORIDE 1000 ML: 900 INJECTION, SOLUTION INTRAVENOUS at 16:33

## 2020-07-30 RX ADMIN — CEFTRIAXONE 1 G: 1 INJECTION, POWDER, FOR SOLUTION INTRAMUSCULAR; INTRAVENOUS at 16:33

## 2020-07-30 RX ADMIN — SODIUM CHLORIDE 1000 ML: 900 INJECTION, SOLUTION INTRAVENOUS at 16:25

## 2020-07-30 RX ADMIN — ONDANSETRON 4 MG: 2 INJECTION INTRAMUSCULAR; INTRAVENOUS at 16:26

## 2020-07-30 NOTE — ED NOTES
Per paperwork provided by EMS, pt was prescribed Flagyl, Truvada and Dolutegravir by [facility] when seen there 2 days ago. They also state that pt is from [place] [address] and was seeing a therapist there who was prescribing Celexa, which pt has not had here.

## 2020-07-30 NOTE — ED TRIAGE NOTES
Pt arrives via EMS with complaints of vomiting since 0600 s/p taking new medications (metronidazole, truvada, and dolutegravir) and unable to eat/drink today. Pt was seen at [facility] in [facility] early this week and prescribed the medications.
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
## 2020-07-30 NOTE — ROUTINE PROCESS
TRANSFER - OUT REPORT: 
 
Verbal report given to Linda Prasad RN(name) on Yaneth Paris  being transferred to [facility] ED(unit) for routine progression of care Report consisted of patients Situation, Background, Assessment and  
Recommendations(SBAR). Information from the following report(s) SBAR was reviewed with the receiving nurse. Lines:  
Peripheral IV 07/30/20 Left Antecubital (Active) Site Assessment Clean, dry, & intact 07/30/20 1520 Dressing Status Clean, dry, & intact 07/30/20 1520 Opportunity for questions and clarification was provided. Patient transported with: 
 [email or web address]

## 2020-07-31 LAB
C TRACH DNA SPEC QL NAA+PROBE: POSITIVE
HBV CORE AB SERPL QL IA: NEGATIVE
N GONORRHOEA DNA SPEC QL NAA+PROBE: NEGATIVE
RPR SER QL: NONREACTIVE
SAMPLE TYPE: ABNORMAL
SERVICE CMNT-IMP: ABNORMAL
SPECIMEN SOURCE: ABNORMAL
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
## 2020-07-31 NOTE — ED NOTES
Discharge paperwork given to pt. All questions and concerns addressed at this time. Pt discharged home in no acute distress and acting age appropriate.

## 2020-08-10 ENCOUNTER — VIRTUAL VISIT (OUTPATIENT)
Dept: FAMILY MEDICINE CLINIC | Age: 20
End: 2020-08-10
Payer: COMMERCIAL

## 2020-08-10 DIAGNOSIS — Z76.89 ENCOUNTER FOR ASSESSMENT OF STD EXPOSURE: Primary | ICD-10-CM

## 2020-08-10 PROCEDURE — 99213 OFFICE O/P EST LOW 20 MIN: CPT | Performed by: NURSE PRACTITIONER
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
## 2020-09-04 ENCOUNTER — TELEPHONE (OUTPATIENT)
Dept: FAMILY MEDICINE CLINIC | Age: 20
End: 2020-09-04

## 2020-09-04 DIAGNOSIS — A74.9 CHLAMYDIA INFECTION: Primary | ICD-10-CM

## 2020-09-04 NOTE — TELEPHONE ENCOUNTER
Patient is scheduled for labs 9/9/20 and your note says Jose. Do you want to order or does she need in office visit?

## 2020-09-19 LAB
A VAGINAE DNA VAG QL NAA+PROBE: ABNORMAL SCORE
BVAB2 DNA VAG QL NAA+PROBE: ABNORMAL SCORE
C ALBICANS DNA VAG QL NAA+PROBE: NEGATIVE
C GLABRATA DNA VAG QL NAA+PROBE: NEGATIVE
C TRACH DNA VAG QL NAA+PROBE: ABNORMAL
MEGA1 DNA VAG QL NAA+PROBE: ABNORMAL SCORE
N GONORRHOEA DNA VAG QL NAA+PROBE: NEGATIVE
T VAGINALIS DNA VAG QL NAA+PROBE: NEGATIVE

## 2020-09-21 RX ORDER — METRONIDAZOLE 500 MG/1
500 TABLET ORAL 2 TIMES DAILY
Qty: 14 TAB | Refills: 0 | Status: SHIPPED | OUTPATIENT
Start: 2020-09-21 | End: 2020-09-28

## 2020-09-22 NOTE — TELEPHONE ENCOUNTER
Your nuswab shows bacterial vaginosis. I am going to send in flagyl to take twice daily for 7 days.  Jeronimo Hernandez

## 2022-03-07 ENCOUNTER — OFFICE VISIT (OUTPATIENT)
Dept: FAMILY MEDICINE CLINIC | Age: 22
End: 2022-03-07
Payer: COMMERCIAL

## 2022-03-07 VITALS
BODY MASS INDEX: 26.12 KG/M2 | HEART RATE: 70 BPM | WEIGHT: 153 LBS | TEMPERATURE: 97.9 F | SYSTOLIC BLOOD PRESSURE: 107 MMHG | HEIGHT: 64 IN | DIASTOLIC BLOOD PRESSURE: 65 MMHG | RESPIRATION RATE: 16 BRPM | OXYGEN SATURATION: 96 %

## 2022-03-07 DIAGNOSIS — N92.0 MENORRHAGIA WITH REGULAR CYCLE: Primary | ICD-10-CM

## 2022-03-07 PROCEDURE — 99213 OFFICE O/P EST LOW 20 MIN: CPT | Performed by: NURSE PRACTITIONER

## 2022-03-07 RX ORDER — DROSPIRENONE AND ETHINYL ESTRADIOL 0.03MG-3MG
1 KIT ORAL DAILY
Qty: 3 EACH | Refills: 3 | Status: SHIPPED | OUTPATIENT
Start: 2022-03-07

## 2022-03-07 NOTE — PROGRESS NOTES
Verified name and birth date for privacy precautions. Chart reviewed in preparation for today's visit. Chief Complaint   Patient presents with    Abdominal Pain     more severe several weeks ago. Near \"lower pelvic part\", felt really bloated, worse when standing. Evaluated by [facility] 02.23.22 and labs returned normal and UPT neg, per pt. No known diagnosis. Resolved itself last week. Wt Readings from Last 3 Encounters:   03/07/22 153 lb (69.4 kg)   07/30/20 137 lb 5.6 oz (62.3 kg)   11/22/19 133 lb (60.3 kg)     Temp Readings from Last 3 Encounters:   03/07/22 97.9 °F (36.6 °C) (Oral)   07/30/20 99 °F (37.2 °C)   11/22/19 98.6 °F (37 °C) (Oral)     BP Readings from Last 3 Encounters:   03/07/22 107/65   07/30/20 (!) 133/95   11/22/19 108/73     Pulse Readings from Last 3 Encounters:   03/07/22 70   07/30/20 83   11/22/19 62         Learning Assessment:  :     No flowsheet data found. Depression Screening:  :     3 most recent PHQ Screens 3/7/2022   PHQ Not Done -   Little interest or pleasure in doing things Not at all   Feeling down, depressed, irritable, or hopeless Not at all   Total Score PHQ 2 0       Fall Risk Assessment:  :     No flowsheet data found. Abuse Screening:  :     Abuse Screening Questionnaire 11/22/2019   Do you ever feel afraid of your partner? N   Are you in a relationship with someone who physically or mentally threatens you? N   Is it safe for you to go home? Y       Coordination of Care Questionnaire:  :     1) Have you been to an emergency room, urgent care clinic since your last visit?  Yes; [facility]  Hospitalized since your last visit? no             2) Have you seen or consulted any other health care providers outside of [address] [place] [address] since your last visit? no  (Include any pap smears or colon screenings in this section.)      Patient is currently accompanied by her self.    ------------------------------------------------

## 2022-03-08 NOTE — PROGRESS NOTES
HISTORY OF PRESENT ILLNESS  Checo Hearn is a 19 y.o. female. HPI  Chief Complaint   Patient presents with    Abdominal Pain     more severe several weeks ago. Near \"lower pelvic part\", felt really bloated, worse when standing. Evaluated by [facility] 02.23.22 and labs returned normal and UPT neg, per pt. No known diagnosis. Resolved itself last week. Used to be on the ocp until several mo ago  Does have fh of ovarian cysts    ROS  A comprehensive review of system was obtained and negative except findings in the HPI    Visit Vitals  /65 (BP 1 Location: Right upper arm, BP Patient Position: Sitting, BP Cuff Size: Adult)   Pulse 70   Temp 97.9 °F (36.6 °C) (Oral)   Resp 16   Ht 5' 4\" (1.626 m)   Wt 153 lb (69.4 kg)   LMP 02/05/2022   SpO2 96%   BMI 26.26 kg/m²     Physical Exam  Vitals and nursing note reviewed. Constitutional:       Appearance: She is well-developed. Comments:      Neck:      Vascular: No JVD. Cardiovascular:      Rate and Rhythm: Normal rate and regular rhythm. Heart sounds: No murmur heard. No friction rub. No gallop. Pulmonary:      Effort: Pulmonary effort is normal. No respiratory distress. Breath sounds: Normal breath sounds. No wheezing. Skin:     General: Skin is warm. Neurological:      Mental Status: She is alert and oriented to person, place, and time. ASSESSMENT and PLAN  Encounter Diagnoses   Name Primary?  Menorrhagia with regular cycle Yes     Orders Placed This Encounter    drospirenone-ethinyl estradioL (RYLAN) 3-0.03 mg tab     Start rylan  Follow up 3 mo  I have discussed the diagnosis with the patient and the intended plan as seen in the above orders. The patient has received an after-visit summary and questions were answered concerning future plans. Patient conveyed understanding of the plan at the time of the visit.     Millie Campbell, MSN, ANP  3/7/2022